# Patient Record
Sex: FEMALE | Race: WHITE | NOT HISPANIC OR LATINO | Employment: PART TIME | URBAN - METROPOLITAN AREA
[De-identification: names, ages, dates, MRNs, and addresses within clinical notes are randomized per-mention and may not be internally consistent; named-entity substitution may affect disease eponyms.]

---

## 2023-12-10 NOTE — PROGRESS NOTES
Assessment/Plan:  Pap every 3 years if normal, STI testing as indicated, exercise most days of week, obtain appropriate diet and hydration, Calcium 1000mg + 600 vit D daily,   Annual mammogram starting at age 34, monthly breast self exam.        Diagnoses and all orders for this visit:    Encounter for gynecological examination without abnormal finding  -     IGP, rfx Aptima HPV ASCU    Encounter for Papanicolaou smear for cervical cancer screening  -     IGP, rfx Aptima HPV ASCU    Screening for endocrine, metabolic and immunity disorder  -     CBC and Platelet; Future  -     Comprehensive metabolic panel; Future  -     TSH, 3rd generation with Free T4 reflex; Future  -     CBC and Platelet  -     Comprehensive metabolic panel  -     TSH, 3rd generation with Free T4 reflex    Encounter for vitamin deficiency screening  -     Vitamin D 25 hydroxy; Future  -     Vitamin D 25 hydroxy    Other orders  -     Prenatal Vit-Fe Fumarate-FA (Prenatal 19) 29-1 MG CHEW  -     valACYclovir (VALTREX) 1,000 mg tablet; 2 (two) times a day as needed  -     Liquid-based pap, screening          Subjective:      Patient ID: Alessio He is a 20 y.o. female. 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  LMP 12/9/2023  She has noticed change in cycle past 2-3 months Spots initially bleeds x 4 days then spots total time 10 days - 2 weeks FH hypothyroid Denies abd or pelvic pain Bowel and bladder are normal NO unusual discharge         The following portions of the patient's history were reviewed and updated as appropriate: allergies, current medications, past family history, past medical history, past social history, past surgical history, and problem list.    Review of Systems   Constitutional:  Negative for fatigue and unexpected weight change. Gastrointestinal:  Negative for abdominal distention, abdominal pain, constipation and diarrhea. Genitourinary:  Negative for difficulty urinating, dyspareunia, dysuria, frequency, genital sores, menstrual problem, pelvic pain, urgency, vaginal bleeding, vaginal discharge and vaginal pain. Neurological:  Negative for headaches. Psychiatric/Behavioral: Negative. Negative for dysphoric mood. The patient is not nervous/anxious. Objective:      /68   Ht 5' 3.75" (1.619 m)   Wt 62.2 kg (137 lb 3.2 oz)   LMP 12/09/2023 (Exact Date)   Breastfeeding No   BMI 23.74 kg/m²          Physical Exam  Vitals and nursing note reviewed. Constitutional:       General: She is not in acute distress. Appearance: Normal appearance. HENT:      Head: Normocephalic and atraumatic. Pulmonary:      Effort: Pulmonary effort is normal.   Chest:   Breasts:     Breasts are symmetrical.      Right: Normal. No mass, nipple discharge, skin change or tenderness. Left: Normal. No mass, nipple discharge, skin change or tenderness. Abdominal:      General: There is no distension. Palpations: Abdomen is soft. Tenderness: There is no abdominal tenderness. There is no guarding or rebound. Genitourinary:     General: Normal vulva. Exam position: Lithotomy position. Labia:         Right: No rash, tenderness, lesion or injury. Left: No rash, tenderness, lesion or injury. Urethra: No prolapse, urethral pain, urethral swelling or urethral lesion. Vagina: Normal. No erythema or lesions. Cervix: No cervical motion tenderness, discharge, lesion or cervical bleeding. Uterus: Normal.       Adnexa: Right adnexa normal and left adnexa normal.        Right: No mass or tenderness. Left: No mass or tenderness. Rectum: No mass or external hemorrhoid.       Comments: PAP from cervix  Musculoskeletal:         General: Normal range of motion. Lymphadenopathy:      Upper Body:      Right upper body: No axillary adenopathy. Left upper body: No axillary adenopathy. Lower Body: No right inguinal adenopathy. No left inguinal adenopathy. Skin:     General: Skin is warm and dry. Neurological:      Mental Status: She is alert and oriented to person, place, and time. Psychiatric:         Mood and Affect: Mood normal.         Behavior: Behavior normal.         Thought Content:  Thought content normal.         Judgment: Judgment normal.

## 2023-12-11 ENCOUNTER — OFFICE VISIT (OUTPATIENT)
Dept: OBGYN CLINIC | Facility: CLINIC | Age: 23
End: 2023-12-11
Payer: COMMERCIAL

## 2023-12-11 VITALS
SYSTOLIC BLOOD PRESSURE: 122 MMHG | BODY MASS INDEX: 23.42 KG/M2 | HEIGHT: 64 IN | DIASTOLIC BLOOD PRESSURE: 68 MMHG | WEIGHT: 137.2 LBS

## 2023-12-11 DIAGNOSIS — Z01.419 ENCOUNTER FOR GYNECOLOGICAL EXAMINATION WITHOUT ABNORMAL FINDING: Primary | ICD-10-CM

## 2023-12-11 DIAGNOSIS — Z13.29 SCREENING FOR ENDOCRINE, METABOLIC AND IMMUNITY DISORDER: ICD-10-CM

## 2023-12-11 DIAGNOSIS — Z13.228 SCREENING FOR ENDOCRINE, METABOLIC AND IMMUNITY DISORDER: ICD-10-CM

## 2023-12-11 DIAGNOSIS — Z13.21 ENCOUNTER FOR VITAMIN DEFICIENCY SCREENING: ICD-10-CM

## 2023-12-11 DIAGNOSIS — Z13.0 SCREENING FOR ENDOCRINE, METABOLIC AND IMMUNITY DISORDER: ICD-10-CM

## 2023-12-11 DIAGNOSIS — Z12.4 ENCOUNTER FOR PAPANICOLAOU SMEAR FOR CERVICAL CANCER SCREENING: ICD-10-CM

## 2023-12-11 PROCEDURE — S0610 ANNUAL GYNECOLOGICAL EXAMINA: HCPCS | Performed by: NURSE PRACTITIONER

## 2023-12-11 RX ORDER — FOLIC ACID, .BETA.-CAROTENE, ASCORBIC ACID, CHOLECALCIFEROL, .ALPHA.-TOCOPHEROL ACETATE, DL-, THIAMINE MONONITRATE, RIBOFLAVIN, NIACINAMIDE, PYRIDOXINE HYDROCHLORIDE, CYANOCOBALAMIN, CALCIUM PANTOTHENATE, CALCIUM CARBONATE, FERROUS FUMARATE, AND ZINC OXIDE 1; 1000; 100; 400; 30; 3; 3; 15; 20; 12; 7; 200; 29; 20 MG/1; [IU]/1; MG/1; [IU]/1; [IU]/1; MG/1; MG/1; MG/1; MG/1; UG/1; MG/1; MG/1; MG/1; MG/1
TABLET, CHEWABLE ORAL
COMMUNITY
Start: 2023-11-18

## 2023-12-11 RX ORDER — VALACYCLOVIR HYDROCHLORIDE 1 G/1
TABLET, FILM COATED ORAL 2 TIMES DAILY PRN
COMMUNITY
Start: 2023-09-29

## 2023-12-11 NOTE — PATIENT INSTRUCTIONS
Pap every 3 years if normal, STI testing as indicated, exercise most days of week, obtain appropriate diet and hydration, Calcium 1000mg + 600 vit D daily,   Annual mammogram starting at age 34, monthly breast self exam.

## 2023-12-12 LAB
25(OH)D3+25(OH)D2 SERPL-MCNC: 21.6 NG/ML (ref 30–100)
ALBUMIN SERPL-MCNC: 4.7 G/DL (ref 4–5)
ALBUMIN/GLOB SERPL: 1.6 {RATIO} (ref 1.2–2.2)
ALP SERPL-CCNC: 74 IU/L (ref 44–121)
ALT SERPL-CCNC: 28 IU/L (ref 0–32)
AST SERPL-CCNC: 26 IU/L (ref 0–40)
BILIRUB SERPL-MCNC: 0.6 MG/DL (ref 0–1.2)
BUN SERPL-MCNC: 9 MG/DL (ref 6–20)
BUN/CREAT SERPL: 12 (ref 9–23)
CALCIUM SERPL-MCNC: 9.6 MG/DL (ref 8.7–10.2)
CHLORIDE SERPL-SCNC: 101 MMOL/L (ref 96–106)
CO2 SERPL-SCNC: 21 MMOL/L (ref 20–29)
CREAT SERPL-MCNC: 0.75 MG/DL (ref 0.57–1)
EGFR: 113 ML/MIN/1.73
ERYTHROCYTE [DISTWIDTH] IN BLOOD BY AUTOMATED COUNT: 12.1 % (ref 11.7–15.4)
GLOBULIN SER-MCNC: 2.9 G/DL (ref 1.5–4.5)
GLUCOSE SERPL-MCNC: 104 MG/DL (ref 70–99)
HCT VFR BLD AUTO: 39.7 % (ref 34–46.6)
HGB BLD-MCNC: 13.5 G/DL (ref 11.1–15.9)
MCH RBC QN AUTO: 31 PG (ref 26.6–33)
MCHC RBC AUTO-ENTMCNC: 34 G/DL (ref 31.5–35.7)
MCV RBC AUTO: 91 FL (ref 79–97)
PLATELET # BLD AUTO: 187 X10E3/UL (ref 150–450)
POTASSIUM SERPL-SCNC: 4.4 MMOL/L (ref 3.5–5.2)
PROT SERPL-MCNC: 7.6 G/DL (ref 6–8.5)
RBC # BLD AUTO: 4.36 X10E6/UL (ref 3.77–5.28)
SODIUM SERPL-SCNC: 140 MMOL/L (ref 134–144)
TSH SERPL DL<=0.005 MIU/L-ACNC: 2.61 UIU/ML (ref 0.45–4.5)
WBC # BLD AUTO: 5.6 X10E3/UL (ref 3.4–10.8)

## 2023-12-13 LAB
CYTOLOGIST CVX/VAG CYTO: NORMAL
DX ICD CODE: NORMAL
OTHER STN SPEC: NORMAL
OTHER STN SPEC: NORMAL
PATH REPORT.FINAL DX SPEC: NORMAL
SL AMB NOTE:: NORMAL
SL AMB SPECIMEN ADEQUACY: NORMAL
SL AMB TEST METHODOLOGY: NORMAL

## 2024-01-15 ENCOUNTER — TELEPHONE (OUTPATIENT)
Dept: OBGYN CLINIC | Facility: CLINIC | Age: 24
End: 2024-01-15

## 2024-01-15 NOTE — TELEPHONE ENCOUNTER
Patient left v/m requesting the recommendations from vitamin D levels.  Reviewed vitamin D recommendations from result notes. She also reports having spotting before her periods, not enough to soak a pad, but minimal. The spotting last up until having full period and then has full period for at least 8 days, sometimes 9 days.  Pt reports she is trying to get pregnant and only has a minimal window when she's not spotting/having period. She is wondering if any of these things could be impacting her fertility or ovulation.  Pt advised to keep a menstrual calendar.  Cindi, please advise for any further recommendations.

## 2024-01-15 NOTE — TELEPHONE ENCOUNTER
Spoke with patient and recommendations given.  Advised to start using ovulation predictor kits.  Pt verbalized understanding and voiced appreciation.

## 2024-04-08 ENCOUNTER — OFFICE VISIT (OUTPATIENT)
Dept: OBGYN CLINIC | Facility: CLINIC | Age: 24
End: 2024-04-08
Payer: COMMERCIAL

## 2024-04-08 VITALS — SYSTOLIC BLOOD PRESSURE: 104 MMHG | BODY MASS INDEX: 24.05 KG/M2 | DIASTOLIC BLOOD PRESSURE: 60 MMHG | WEIGHT: 139 LBS

## 2024-04-08 DIAGNOSIS — Z11.3 SCREEN FOR STD (SEXUALLY TRANSMITTED DISEASE): ICD-10-CM

## 2024-04-08 DIAGNOSIS — Z31.69 INFERTILITY COUNSELING: Primary | ICD-10-CM

## 2024-04-08 PROCEDURE — 99214 OFFICE O/P EST MOD 30 MIN: CPT | Performed by: OBSTETRICS & GYNECOLOGY

## 2024-04-08 NOTE — PROGRESS NOTES
PROBLEM GYNECOLOGICAL VISIT    Beatriz Sánchez is a 23 y.o. female who presents today with complaint of irregular menses .  Trying to conceive since 2023. But of  ocp since  2023  Her general medical history has been reviewed and she reports it as follows:    Past Medical History:   Diagnosis Date   • Vitiligo      Past Surgical History:   Procedure Laterality Date   • WISDOM TOOTH EXTRACTION       OB History        0    Para   0    Term   0       0    AB   0    Living   0       SAB   0    IAB   0    Ectopic   0    Multiple   0    Live Births   0           Obstetric Comments   Menarche  at  10   ,  28-30    wearing tampons   chg  every   8 hours  ,  not up at night   to chg  cramps    not terrible  mild to mod   Started   birth control at  age    14 for contraception    hx of   HMB as a teen  and cramps             Social History     Tobacco Use   • Smoking status: Never   • Smokeless tobacco: Never   Vaping Use   • Vaping status: Never Used   Substance Use Topics   • Alcohol use: Not Currently   • Drug use: Never       Current Outpatient Medications   Medication Instructions   • Cholecalciferol (CHOLECALCIFEROL) 1,000 Units, Oral, Daily, Takes 2,000 IU daily   • Prenatal Vit-Fe Fumarate-FA (Prenatal 19) 29-1 MG CHEW    • valACYclovir (VALTREX) 1,000 mg tablet 2 times daily PRN       History of Present Illness:   NP, student .   Stopped pill  in  2023  pull out until  trying to conceive since 2023.    2023  normal  tsh,  CMB slight low  Vit  d  on supplementation    2000 iu a day.  Cycles  previously  in falll  25 d  for  10-12 d   Menarche at  10 years of age  28-30 .   Same partner    No hx of  std  .  No  hx of abn pap,  one  cycle of   ovulation predictors   + surge .  +  intercourse  every   other day or  every  third  day.       24 yo   no hx of a child .   No medical  hx,    wears  boxer  briefs.   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    .   + sister w  thryoid  disorders  .  Sister w  child w  cleft   pallet and   intra uterine  demise.  Runner!     Review of Systems:  Review of Systems   Constitutional: Negative.    Gastrointestinal: Negative.    Endocrine: Negative.    Genitourinary:  Positive for menstrual problem and vaginal discharge.        IRA and urgency of urination  has always  been    No  urology   work up .    Musculoskeletal: Negative.    Skin: Negative.    Allergic/Immunologic: Negative.    Neurological: Negative.    Hematological: Negative.    Psychiatric/Behavioral: Negative.         Physical Exam:  /60   Wt 63 kg (139 lb)   LMP 03/30/2024 (Exact Date)   BMI 24.05 kg/m²   Physical Exam  Constitutional:       Appearance: Normal appearance.   Genitourinary:      Bladder, rectum and urethral meatus normal.      Genitourinary Comments: Brown  dc  present        Right Labia: No rash, tenderness, lesions or skin changes.     Left Labia: No tenderness, lesions, skin changes or rash.     No inguinal adenopathy present in the right or left side.     No vaginal discharge, erythema, tenderness, ulceration or granulation tissue.      No vaginal prolapse present.     No vaginal atrophy present.       Right Adnexa: not tender, not full and no mass present.     Left Adnexa: not tender, not full and no mass present.     Cervix is not nulliparous or parous.      No cervical motion tenderness, discharge, friability, polyp or nabothian cyst.      Uterus is not enlarged, fixed, tender, irregular or prolapsed.      No uterine mass detected.     No urethral prolapse, tenderness or mass present.      Pelvic Floor: Levator muscle strength is 4/5.     Pelvic floor neuro is intact.     Pelvic exam was performed with patient in the lithotomy position.   Cardiovascular:      Rate and Rhythm: Normal rate and regular rhythm.   Abdominal:      Palpations: Abdomen is soft.      Hernia: There is no hernia in the left inguinal area or right inguinal area.   Musculoskeletal:       Cervical back: Normal range of motion.   Lymphadenopathy:      Lower Body: No right inguinal adenopathy. No left inguinal adenopathy.   Neurological:      Mental Status: She is alert.         Assessment:   1.  Trying to conceive for   6-12 mo.   Screen for std   , prolonged cycle      Plan:   Decrease running go to [employer] or yoga.  Switch  to   boxers.   Day 3  fsh and  lh w estradiol.  Day 5-10  tv us  day  21 fasting   prolactin and  progesterone.  TC  semen analysis  order given and   HSG. I have spent a total time of 30 minutes on 04/08/24 in caring for this patient including Risks and benefits of tx options, Instructions for management, Patient and family education, Risk factor reductions, Counseling / Coordination of care, Documenting in the medical record, Reviewing / ordering tests, medicine, procedures  , and Obtaining or reviewing history  . Continue  pnv  no drinking      Reviewed with patient that [facility], but that they will not necessarily be reviewed by me immediately.  Explained that I will review results at my earliest opportunity and contact patient appropriately.

## 2024-04-11 LAB
C TRACH RRNA SPEC QL NAA+PROBE: NEGATIVE
N GONORRHOEA RRNA SPEC QL NAA+PROBE: NEGATIVE

## 2024-04-30 ENCOUNTER — NURSE TRIAGE (OUTPATIENT)
Age: 24
End: 2024-04-30

## 2024-04-30 DIAGNOSIS — Z31.69 INFERTILITY COUNSELING: Primary | ICD-10-CM

## 2024-04-30 NOTE — TELEPHONE ENCOUNTER
Reason for Disposition  • Information only question and nurse able to answer    Answer Assessment - Initial Assessment Questions  1. REASON FOR CALL or QUESTION:     Beatriz went to lab today for Day 3 labs.  Technician would not separate Day 3 from Day 21.  Recommended she obtain new scripts and return for blood draw.  Beatriz is currently on Day 3 and was not able to return to lab. She allowed them to draw blood for all the orders and is asking for new orders to be re-done on day 21    Needs Day 21 labs Prolactin and Progesterone    Protocols used: Information Only Call - No Triage-ADULT-[place]

## 2024-05-01 LAB
ESTRADIOL SERPL-MCNC: 86 PG/ML
FSH SERPL-ACNC: 4.8 MIU/ML
LH SERPL-ACNC: 6.1 MIU/ML
PROGEST SERPL-MCNC: 1 NG/ML
PROLACTIN SERPL-MCNC: 53.2 NG/ML (ref 4.8–33.4)
TESTOST FREE SERPL-MCNC: 4.6 PG/ML (ref 0–4.2)
TESTOST SERPL-MCNC: 39 NG/DL (ref 13–71)
TSH SERPL DL<=0.005 MIU/L-ACNC: 2.7 UIU/ML (ref 0.45–4.5)

## 2024-05-04 ENCOUNTER — HOSPITAL ENCOUNTER (OUTPATIENT)
Dept: ULTRASOUND IMAGING | Facility: HOSPITAL | Age: 24
Discharge: HOME | End: 2024-05-04
Payer: COMMERCIAL

## 2024-05-04 DIAGNOSIS — Z31.69 INFERTILITY COUNSELING: ICD-10-CM

## 2024-05-04 PROCEDURE — 76830 TRANSVAGINAL US NON-OB: CPT

## 2024-05-04 PROCEDURE — 76856 US EXAM PELVIC COMPLETE: CPT

## 2024-05-08 PROBLEM — Z11.3 SCREEN FOR STD (SEXUALLY TRANSMITTED DISEASE): Status: RESOLVED | Noted: 2024-04-08 | Resolved: 2024-05-08

## 2024-05-18 LAB
PROGEST SERPL-MCNC: 4.2 NG/ML
PROLACTIN SERPL-MCNC: 13.6 NG/ML (ref 4.8–33.4)

## 2024-06-11 ENCOUNTER — TELEPHONE (OUTPATIENT)
Age: 24
End: 2024-06-11

## 2024-06-11 NOTE — TELEPHONE ENCOUNTER
Pt called in to review results of the semen analysis test. Patient stated that her  went to main line fertility in [facility], and patient was notified that results aren't uploaded to her chart. Patient stated that her  tried calling the lab he went to, to request results but the lab was closed for the day, pt stated that her  will try to call tomorrow.

## 2024-06-12 ENCOUNTER — TELEPHONE (OUTPATIENT)
Dept: OBGYN CLINIC | Facility: CLINIC | Age: 24
End: 2024-06-12

## 2024-06-12 NOTE — TELEPHONE ENCOUNTER
Beatriz states she and spouse Dakota have been trying to obtain the outcome of the Semen Analysis for days. Wishes to speak to provider. Assured Beatriz result is in her chart for review.

## 2024-06-13 NOTE — PROGRESS NOTES
Tc to pt   morphology   off.  Pt  aware.  And   is going to make some life style changes.  Would like to repeat in a few months. Pt doing a menstrual  calendar.  OV predictors are stressful.  Fu in 9/2024  has been actively trying since 11/2023.  Not ready for  [place].

## 2024-08-13 ENCOUNTER — TELEPHONE (OUTPATIENT)
Age: 24
End: 2024-08-13

## 2024-08-13 NOTE — TELEPHONE ENCOUNTER
Patient newly pregnant. By lmp is 4w 4d today.  Leaving for [facility] 9/1. Reviewed flight instructins  Patient will send do arcos a myc message with any further concerns

## 2024-08-13 NOTE — TELEPHONE ENCOUNTER
Pt is pregnant and plans to take a trip to [place] in early September. She wants to know if this is safe to do this early in her pregnancy and precautions she should take. Please advise.

## 2024-08-26 ENCOUNTER — NURSE TRIAGE (OUTPATIENT)
Age: 24
End: 2024-08-26

## 2024-08-26 ENCOUNTER — HOSPITAL ENCOUNTER (EMERGENCY)
Facility: HOSPITAL | Age: 24
Discharge: HOME | End: 2024-08-26
Attending: EMERGENCY MEDICINE
Payer: COMMERCIAL

## 2024-08-26 ENCOUNTER — APPOINTMENT (OUTPATIENT)
Dept: ULTRASOUND IMAGING | Facility: HOSPITAL | Age: 24
End: 2024-08-26
Payer: COMMERCIAL

## 2024-08-26 VITALS
BODY MASS INDEX: 24.05 KG/M2 | WEIGHT: 139 LBS | HEART RATE: 80 BPM | TEMPERATURE: 98.3 F | OXYGEN SATURATION: 99 % | SYSTOLIC BLOOD PRESSURE: 122 MMHG | RESPIRATION RATE: 18 BRPM | DIASTOLIC BLOOD PRESSURE: 75 MMHG

## 2024-08-26 DIAGNOSIS — O26.891 ABDOMINAL PAIN DURING PREGNANCY IN FIRST TRIMESTER: Primary | ICD-10-CM

## 2024-08-26 DIAGNOSIS — R10.9 ABDOMINAL PAIN DURING PREGNANCY IN FIRST TRIMESTER: Primary | ICD-10-CM

## 2024-08-26 LAB
ABO GROUP BLD: NORMAL
ANION GAP SERPL CALCULATED.3IONS-SCNC: 8 MMOL/L (ref 4–13)
B-HCG SERPL-ACNC: ABNORMAL MIU/ML (ref 0–5)
BASOPHILS # BLD AUTO: 0.04 THOUSANDS/ÂΜL (ref 0–0.1)
BASOPHILS NFR BLD AUTO: 0 % (ref 0–1)
BILIRUB UR QL STRIP: NEGATIVE
BLD GP AB SCN SERPL QL: NEGATIVE
BUN SERPL-MCNC: 12 MG/DL (ref 5–25)
CALCIUM SERPL-MCNC: 9.3 MG/DL (ref 8.4–10.2)
CHLORIDE SERPL-SCNC: 104 MMOL/L (ref 96–108)
CLARITY UR: CLEAR
CO2 SERPL-SCNC: 25 MMOL/L (ref 21–32)
COLOR UR: YELLOW
CREAT SERPL-MCNC: 0.66 MG/DL (ref 0.6–1.3)
EOSINOPHIL # BLD AUTO: 0.1 THOUSAND/ÂΜL (ref 0–0.61)
EOSINOPHIL NFR BLD AUTO: 1 % (ref 0–6)
ERYTHROCYTE [DISTWIDTH] IN BLOOD BY AUTOMATED COUNT: 11.7 % (ref 11.6–15.1)
EXT PREGNANCY TEST URINE: POSITIVE
EXT. CONTROL: ABNORMAL
GFR SERPL CREATININE-BSD FRML MDRD: 122 ML/MIN/1.73SQ M
GLUCOSE SERPL-MCNC: 93 MG/DL (ref 65–140)
GLUCOSE UR STRIP-MCNC: NEGATIVE MG/DL
HCT VFR BLD AUTO: 35.5 % (ref 34.8–46.1)
HGB BLD-MCNC: 12.4 G/DL (ref 11.5–15.4)
HGB UR QL STRIP.AUTO: NEGATIVE
IMM GRANULOCYTES # BLD AUTO: 0.05 THOUSAND/UL (ref 0–0.2)
IMM GRANULOCYTES NFR BLD AUTO: 1 % (ref 0–2)
KETONES UR STRIP-MCNC: NEGATIVE MG/DL
LEUKOCYTE ESTERASE UR QL STRIP: NEGATIVE
LYMPHOCYTES # BLD AUTO: 1.82 THOUSANDS/ÂΜL (ref 0.6–4.47)
LYMPHOCYTES NFR BLD AUTO: 18 % (ref 14–44)
MCH RBC QN AUTO: 32.3 PG (ref 26.8–34.3)
MCHC RBC AUTO-ENTMCNC: 34.9 G/DL (ref 31.4–37.4)
MCV RBC AUTO: 92 FL (ref 82–98)
MONOCYTES # BLD AUTO: 0.92 THOUSAND/ÂΜL (ref 0.17–1.22)
MONOCYTES NFR BLD AUTO: 9 % (ref 4–12)
NEUTROPHILS # BLD AUTO: 7.07 THOUSANDS/ÂΜL (ref 1.85–7.62)
NEUTS SEG NFR BLD AUTO: 71 % (ref 43–75)
NITRITE UR QL STRIP: NEGATIVE
NRBC BLD AUTO-RTO: 0 /100 WBCS
PH UR STRIP.AUTO: 6.5 [PH]
PLATELET # BLD AUTO: 192 THOUSANDS/UL (ref 149–390)
PMV BLD AUTO: 10 FL (ref 8.9–12.7)
POTASSIUM SERPL-SCNC: 3.4 MMOL/L (ref 3.5–5.3)
PROT UR STRIP-MCNC: NEGATIVE MG/DL
RBC # BLD AUTO: 3.84 MILLION/UL (ref 3.81–5.12)
RH BLD: POSITIVE
SODIUM SERPL-SCNC: 137 MMOL/L (ref 135–147)
SP GR UR STRIP.AUTO: 1.01 (ref 1–1.03)
SPECIMEN EXPIRATION DATE: NORMAL
UROBILINOGEN UR STRIP-ACNC: <2 MG/DL
WBC # BLD AUTO: 10 THOUSAND/UL (ref 4.31–10.16)

## 2024-08-26 PROCEDURE — 76801 OB US < 14 WKS SINGLE FETUS: CPT

## 2024-08-26 PROCEDURE — 99284 EMERGENCY DEPT VISIT MOD MDM: CPT | Performed by: EMERGENCY MEDICINE

## 2024-08-26 PROCEDURE — 85025 COMPLETE CBC W/AUTO DIFF WBC: CPT | Performed by: EMERGENCY MEDICINE

## 2024-08-26 PROCEDURE — 81003 URINALYSIS AUTO W/O SCOPE: CPT | Performed by: EMERGENCY MEDICINE

## 2024-08-26 PROCEDURE — 36415 COLL VENOUS BLD VENIPUNCTURE: CPT | Performed by: EMERGENCY MEDICINE

## 2024-08-26 PROCEDURE — 99284 EMERGENCY DEPT VISIT MOD MDM: CPT

## 2024-08-26 PROCEDURE — 86850 RBC ANTIBODY SCREEN: CPT | Performed by: EMERGENCY MEDICINE

## 2024-08-26 PROCEDURE — 86901 BLOOD TYPING SEROLOGIC RH(D): CPT | Performed by: EMERGENCY MEDICINE

## 2024-08-26 PROCEDURE — 81025 URINE PREGNANCY TEST: CPT | Performed by: EMERGENCY MEDICINE

## 2024-08-26 PROCEDURE — 80048 BASIC METABOLIC PNL TOTAL CA: CPT | Performed by: EMERGENCY MEDICINE

## 2024-08-26 PROCEDURE — 84702 CHORIONIC GONADOTROPIN TEST: CPT | Performed by: EMERGENCY MEDICINE

## 2024-08-26 PROCEDURE — 86900 BLOOD TYPING SEROLOGIC ABO: CPT | Performed by: EMERGENCY MEDICINE

## 2024-08-26 NOTE — TELEPHONE ENCOUNTER
"Patient calling in stating that she's having right sided pelvic pain and patient is reporting she is currently pregnant. Pt LMP 7/12/24. Pt reporting that the pain radiates to her lower back at times and can sharp at  times. Pt reporting this started a week to a week and a half ago. Pt reporting gradual onset and sharp at times that comes and goes. Pt reporting 3-4/10. Pt reporting sitting makes it worse. And standing relieves it slightly     Patient is advised to go to the nearest emergency room for further evaluation now. Patient stated that she's unsure if she can go tonight, but pt reporting that she can go tomorrow. Pt reporting that she will be going to [facility] tomorrow.     Epic Secure Chat sent to Dr Cornell Madrigal- on call provider     Reason for Disposition   Intermittent lower abdominal pain lasting > 24 hours    Answer Assessment - Initial Assessment Questions  1. LOCATION: \"Where does it hurt?\"       Lower right abdominal pain   2. RADIATION: \"Does the pain shoot anywhere else?\" (e.g., chest, back, shoulder)      Back on the right sick   3. ONSET: \"When did the pain begin?\" (e.g., minutes, hours or days ago)       A week- a week and a half ago.   4. ONSET: \"Gradual or sudden onset?\"      Pt reporting gradual onset and sharp at times.   5. PATTERN \"Does the pain come and go, or has it been constant since it started?\"       Pt reporting it comes and goes   6. SEVERITY: \"How bad is the pain?\" \"What does it keep you from doing?\"  (e.g., Scale 1-10; mild, moderate, or severe)    - MILD (1-3): doesn't interfere with normal activities, abdomen soft and not tender to touch     - MODERATE (4-7): interferes with normal activities or awakens from sleep, tender to touch     - SEVERE (8-10): excruciating pain, doubled over, unable to do any normal activities      Pt reporting 3-4/10   7. RECURRENT SYMPTOM: \"Have you ever had this type of stomach pain before?\" If Yes, ask: \"When was the last time?\" and " "\"What happened that time?\"       Pt denies   8. CAUSE: \"What do you think is causing the stomach pain?\"      Pt unsure   9. RELIEVING/AGGRAVATING FACTORS: \"What makes it better or worse?\" (e.g., antacids, bowel movement, movement)      Pt reporting sitting makes it worse. And standing relieves it slightly   10. OTHER SYMPTOMS: \"Has there been any vaginal bleeding, fever, vomiting, diarrhea, or urine problems?\"        Pt denies all other symptoms   11. ALIDA: \"What date are you expecting to deliver?\"        N/A    Protocols used: Pregnancy - Abdominal Pain Less Than 20 Weeks EGA-ADULT-[place]    "

## 2024-08-26 NOTE — ED NOTES
Unable to obtain BW in triage. 1 attempt made by tech and pt requesting to wait until in room for second attempt     Lisa Arias RN  08/26/24 [id number]

## 2024-08-27 NOTE — ED PROVIDER NOTES
History  Chief Complaint   Patient presents with    Abdominal Pain     Pt to ED c/o abd pain. 7 weeks pregnant. Over past week been having intermittent R lower abd pain. Sometimes sharp. Today also have R lower back pain.     Beatriz Sánchez is a 24 y.o. year old  female presenting to the [facility] ED for abdominal pain. Patient has had one week of intermittent right lower abdominal pain. Pain is reported to be mild in intensity. She is also reporting occasional right sided low back pain.  No associated fevers or chills.  No nausea, vomiting or diarrhea.  No dysuria or hematuria.  No vaginal bleeding or discharge.  Patient contacted her OB/GYN regarding symptoms and was referred to the emergency department for evaluation. Patient has not taken/received any medications at home for relief of symptoms. Patient denies history of prior abdominal surgeries.      History provided by:  Medical records and patient   used: No    Abdominal Pain  Associated symptoms: no chest pain, no diarrhea, no dysuria, no fever, no hematuria, no nausea, no shortness of breath, no vaginal bleeding, no vaginal discharge and no vomiting        Prior to Admission Medications   Prescriptions Last Dose Informant Patient Reported? Taking?   Prenatal Vit-Fe Fumarate-FA (Prenatal 19) 29-1 MG CHEW   Yes No   cholecalciferol 1,000 units tablet   Yes No   Sig: Take 1,000 Units by mouth daily Takes 2,000 IU daily   valACYclovir (VALTREX) 1,000 mg tablet   Yes No   Si (two) times a day as needed      Facility-Administered Medications: None       Past Medical History:   Diagnosis Date    Vitiligo        Past Surgical History:   Procedure Laterality Date    WISDOM TOOTH EXTRACTION         Family History   Problem Relation Age of Onset    Thyroid disease Mother     Thyroid disease Sister     Heart attack Paternal Grandfather     Heart disease Paternal Grandfather     Breast cancer Neg Hx     Uterine cancer Neg Hx     Ovarian  cancer Neg Hx     Colon cancer Neg Hx      I have reviewed and agree with the history as documented.    E-Cigarette/Vaping    E-Cigarette Use Never User      E-Cigarette/Vaping Substances    Nicotine No     THC No     CBD No     Flavoring No     Other No     Unknown No      Social History     Tobacco Use    Smoking status: Never    Smokeless tobacco: Never   Vaping Use    Vaping status: Never Used   Substance Use Topics    Alcohol use: Not Currently    Drug use: Never       Review of Systems   Constitutional:  Negative for fever.   Respiratory:  Negative for shortness of breath.    Cardiovascular:  Negative for chest pain.   Gastrointestinal:  Positive for abdominal pain. Negative for diarrhea, nausea and vomiting.   Genitourinary:  Negative for dysuria, flank pain, hematuria, vaginal bleeding and vaginal discharge.   Musculoskeletal:  Positive for back pain.   All other systems reviewed and are negative.      Physical Exam  Physical Exam  Vitals and nursing note reviewed.   Constitutional:       General: She is not in acute distress.     Appearance: Normal appearance. She is well-developed. She is not ill-appearing, toxic-appearing or diaphoretic.   HENT:      Head: Normocephalic and atraumatic.      Nose: No congestion or rhinorrhea.   Eyes:      General:         Right eye: No discharge.         Left eye: No discharge.   Cardiovascular:      Rate and Rhythm: Normal rate and regular rhythm.   Pulmonary:      Effort: Pulmonary effort is normal. No accessory muscle usage or respiratory distress.      Breath sounds: Normal breath sounds. No stridor. No decreased breath sounds, wheezing, rhonchi or rales.   Abdominal:      General: Bowel sounds are normal. There is no distension.      Palpations: Abdomen is soft.      Tenderness: There is no abdominal tenderness. There is no right CVA tenderness, left CVA tenderness, guarding or rebound.   Musculoskeletal:      Cervical back: Normal range of motion and neck supple.       Right lower leg: No tenderness. No edema.      Left lower leg: No tenderness. No edema.   Skin:     Capillary Refill: Capillary refill takes less than 2 seconds.      Findings: No rash.   Neurological:      Mental Status: She is alert and oriented to person, place, and time.   Psychiatric:         Mood and Affect: Mood normal.         Behavior: Behavior normal.         Vital Signs  ED Triage Vitals [08/26/24 1837]   Temperature Pulse Respirations Blood Pressure SpO2   98.3 °F (36.8 °C) 97 18 (!) 167/101 100 %      Temp Source Heart Rate Source Patient Position - Orthostatic VS BP Location FiO2 (%)   Temporal Monitor Sitting Left arm --      Pain Score       3           Vitals:    08/26/24 1837 08/26/24 2124   BP: (!) 167/101 122/75   Pulse: 97 80   Patient Position - Orthostatic VS: Sitting          Visual Acuity      ED Medications  Medications - No data to display    Diagnostic Studies  Results Reviewed       Procedure Component Value Units Date/Time    Quantitative hCG [[id number]]  (Abnormal) Collected: 08/26/24 2158    Lab Status: Final result Specimen: Blood from Arm, Right Updated: 08/26/24 2327     HCG, Quant 23,714.6 mIU/mL     Narrative:       Expected Ranges:    HCG results between 5.0 and 25.0 mIU/mL may be indicative of early pregnancy but should be interpreted in light of the total clinical presentation.    HCG can rise to detectable levels in amrky and post menopausal women (0-11.6 mIU/mL).     Approximate               Approximate HCG  Gestation age          Concentration ( mIU/mL)  _____________          ______________________   Weeks                      HCG values  0.2-1                       5-50  1-2                           2-3                         100-5000  3-4                         500-33466  4-5                         1000-17934  5-6                         53720-090857  6-8                         [id number]  8-12                        30410-608540      Basic metabolic panel  [[id number]]  (Abnormal) Collected: 08/26/24 2158    Lab Status: Final result Specimen: Blood from Arm, Right Updated: 08/26/24 2253     Sodium 137 mmol/L      Potassium 3.4 mmol/L      Chloride 104 mmol/L      CO2 25 mmol/L      ANION GAP 8 mmol/L      BUN 12 mg/dL      Creatinine 0.66 mg/dL      Glucose 93 mg/dL      Calcium 9.3 mg/dL      eGFR 122 ml/min/1.73sq m     Narrative:      National Kidney Disease Foundation guidelines for Chronic Kidney Disease (CKD):     Stage 1 with normal or high GFR (GFR > 90 mL/min/1.73 square meters)    Stage 2 Mild CKD (GFR = 60-89 mL/min/1.73 square meters)    Stage 3A Moderate CKD (GFR = 45-59 mL/min/1.73 square meters)    Stage 3B Moderate CKD (GFR = 30-44 mL/min/1.73 square meters)    Stage 4 Severe CKD (GFR = 15-29 mL/min/1.73 square meters)    Stage 5 End Stage CKD (GFR <15 mL/min/1.73 square meters)  Note: GFR calculation is accurate only with a steady state creatinine    CBC and differential [[id number]] Collected: 08/26/24 2158    Lab Status: Final result Specimen: Blood from Arm, Right Updated: 08/26/24 2212     WBC 10.00 Thousand/uL      RBC 3.84 Million/uL      Hemoglobin 12.4 g/dL      Hematocrit 35.5 %      MCV 92 fL      MCH 32.3 pg      MCHC 34.9 g/dL      RDW 11.7 %      MPV 10.0 fL      Platelets 192 Thousands/uL      nRBC 0 /100 WBCs      Segmented % 71 %      Immature Grans % 1 %      Lymphocytes % 18 %      Monocytes % 9 %      Eosinophils Relative 1 %      Basophils Relative 0 %      Absolute Neutrophils 7.07 Thousands/µL      Absolute Immature Grans 0.05 Thousand/uL      Absolute Lymphocytes 1.82 Thousands/µL      Absolute Monocytes 0.92 Thousand/µL      Eosinophils Absolute 0.10 Thousand/µL      Basophils Absolute 0.04 Thousands/µL     UA w Reflex to Microscopic w Reflex to Culture [[id number]] Collected: 08/26/24 2118    Lab Status: Final result Specimen: Urine, Clean Catch Updated: 08/26/24 2141     Color, UA Yellow     Clarity, UA Clear     Specific  Gravity, UA 1.010     pH, UA 6.5     Leukocytes, UA Negative     Nitrite, UA Negative     Protein, UA Negative mg/dl      Glucose, UA Negative mg/dl      Ketones, UA Negative mg/dl      Urobilinogen, UA <2.0 mg/dl      Bilirubin, UA Negative     Occult Blood, UA Negative    POCT pregnancy, urine [[id number]]  (Abnormal) Resulted: 08/26/24 2121    Lab Status: Final result Updated: 08/26/24 2121     EXT Preg Test, Ur Positive     Control Valid                   US OB < 14 weeks with transvaginal   Final Result by Obie Deutsch DO (08/26 2108)   Single live intrauterine gestation at 5 weeks 6 days.   ALIDA of 4/22/2025. Embryonic heart activity visible although reliable tracing could not be obtained.      Normal ovaries. No sonographic evidence of torsion at time of imaging. No adnexal mass.      Workstation performed: [id number]                    Procedures  Procedures         ED Course                                 SBIRT 20yo+      Flowsheet Row Most Recent Value   Initial Alcohol Screen: US AUDIT-C     1. How often do you have a drink containing alcohol? 0 Filed at: 08/26/2024 1838   2. How many drinks containing alcohol do you have on a typical day you are drinking?  0 Filed at: 08/26/2024 1838   3a. Male UNDER 65: How often do you have five or more drinks on one occasion? 0 Filed at: 08/26/2024 1838   3b. FEMALE Any Age, or MALE 65+: How often do you have 4 or more drinks on one occassion? 0 Filed at: 08/26/2024 1838   Audit-C Score 0 Filed at: 08/26/2024 1838   LILY: How many times in the past year have you...    Used an illegal drug or used a prescription medication for non-medical reasons? Never Filed at: 08/26/2024 1838                      Medical Decision Making    24 y.o. female presenting for intermittent lower abdominal and low back pain.  Vitals are stable, nontoxic-appearing.  Will order labs to screen for leukocytosis, anemia, joint abnormality.  Will check UA to screen for UTI.  Will order type  and screen, quantitative hCG and pelvic ultrasound to exclude ovarian torsion or ectopic pregnancy.  There are no fevers or vomiting and patient has no objective lower abdominal tenderness on exam.  I do not suspect kidney stone or appendicitis.  Symptoms more likely attributable to first trimester pregnancy.    Reassessment: Vital stable during ED course.  No vomiting and resting comfortably.  I reviewed the lab, UA and ultrasound results with the patient and her significant other at bedside.  Continue to suspect pains related to early pregnancy.    Disposition: I have discussed with the patient our plan to discharge them from the ED and the patient is in agreement with this plan.     Discharge Plan: Encouraged Tylenol as needed for discomfort and outpatient OB f/u. RTED precautions emphasized. The patient was provided a written after visit summary with strict RTED precautions.     Followup: I have discussed with the patient plan to follow up with OB/GYN. Contact information provided in AVS.    Amount and/or Complexity of Data Reviewed  Labs: ordered.                 Disposition  Final diagnoses:   Abdominal pain during pregnancy in first trimester     Time reflects when diagnosis was documented in both MDM as applicable and the Disposition within this note       Time User Action Codes Description Comment    8/26/2024 10:28 PM Papa Aguayo Add [O26.891,  R10.9] Abdominal pain during pregnancy in first trimester           ED Disposition       ED Disposition   Discharge    Condition   Stable    Date/Time   Mon Aug 26, 2024 2227    Comment   Beatriz Sánchez discharge to home/self care.                   Follow-up Information       Follow up With Specialties Details Why Contact Info Additional Information    [facility] [place] [facility] Schedule an appointment as soon as possible for a visit  To make appointment for reevaluation in 3-5 days. [address]  [place]  [place] [address]  [phone] [facility] [place], [address], [place], [address], [phone]            Discharge Medication List as of 8/26/2024 10:32 PM        CONTINUE these medications which have NOT CHANGED    Details   cholecalciferol 1,000 units tablet Take 1,000 Units by mouth daily Takes 2,000 IU daily, Historical Med      Prenatal Vit-Fe Fumarate-FA (Prenatal 19) 29-1 MG CHEW Historical Med      valACYclovir (VALTREX) 1,000 mg tablet 2 (two) times a day as needed, Starting Fri 9/29/2023, Historical Med             No discharge procedures on file.    PDMP Review       None            ED Provider  Electronically Signed by             Papa Aguayo DO  08/27/24 0242

## 2024-08-27 NOTE — DISCHARGE INSTRUCTIONS
You have been seen for abdominal pain. Please take tylenol as needed for pain. Return to the emergency department if you develop worsening pain, vomiting, fevers, vaginal bleeding or any other symptoms of concern. Please follow up with your OB/GYN by calling the number provided.

## 2024-09-13 ENCOUNTER — ULTRASOUND (OUTPATIENT)
Dept: OBGYN CLINIC | Facility: CLINIC | Age: 24
End: 2024-09-13
Payer: COMMERCIAL

## 2024-09-13 VITALS
BODY MASS INDEX: 24.24 KG/M2 | HEIGHT: 64 IN | SYSTOLIC BLOOD PRESSURE: 122 MMHG | DIASTOLIC BLOOD PRESSURE: 66 MMHG | WEIGHT: 142 LBS

## 2024-09-13 DIAGNOSIS — O26.891 VAGINAL DISCHARGE DURING PREGNANCY IN FIRST TRIMESTER: Primary | ICD-10-CM

## 2024-09-13 DIAGNOSIS — Z3A.08 8 WEEKS GESTATION OF PREGNANCY: ICD-10-CM

## 2024-09-13 DIAGNOSIS — N89.8 VAGINAL DISCHARGE DURING PREGNANCY IN FIRST TRIMESTER: Primary | ICD-10-CM

## 2024-09-13 DIAGNOSIS — O20.9 BLEEDING IN EARLY PREGNANCY: ICD-10-CM

## 2024-09-13 LAB
CLUE CELLS SPEC QL WET PREP: ABNORMAL
PH SMN: 4.5 [PH]
SL AMB POCT WET MOUNT: ABNORMAL
T VAGINALIS VAG QL WET PREP: ABNORMAL
YEAST VAG QL WET PREP: ABNORMAL

## 2024-09-13 PROCEDURE — 76817 TRANSVAGINAL US OBSTETRIC: CPT | Performed by: PHYSICIAN ASSISTANT

## 2024-09-13 PROCEDURE — 99213 OFFICE O/P EST LOW 20 MIN: CPT | Performed by: PHYSICIAN ASSISTANT

## 2024-09-13 PROCEDURE — 87210 SMEAR WET MOUNT SALINE/INK: CPT | Performed by: PHYSICIAN ASSISTANT

## 2024-09-13 NOTE — PROGRESS NOTES
Pregnancy Confirmation Visit  [facility] - [address], [place] [address]    Assessment/Plan:  24 y.o.  presenting with missed menses.  Viable pregnancy 8w3d by first ultrasound done in ER on 2024.   - Continue/start prenatal vitamin  - We reviewed her current medications and discussed which are safe to continue in pregnancy  - We briefly discussed options for aneuploidy screening, to be discussed further at the prenatal intake, MFM referral given.   - Schedule prenatal intake with RN and initial prenatal visit; prenatal labs will be ordered during the prenatal intake    Additional Pregnancy Problems Addressed today:   1. Vaginal discharge during pregnancy in first trimester  Assessment & Plan:  Reviewed yeast on wet mount. Recommend treating with 3 or 7 day monistat.   Orders:  -     POCT wet mount  2. 8 weeks gestation of pregnancy  -     Ambulatory Referral to Maternal Fetal Medicine; Future; Expected date: 2024  -     AMB  OB < 14 weeks single or first gestation level 1  3. Bleeding in early pregnancy  -     [facility] OB < 14 weeks single or first gestation level 1        Subjective:    CC: Missed period    Beatriz Sánchez is a 24 y.o.  who presents with missed menses.  Patient's last menstrual period was 2024 (exact date)..    Patient notes that this pregnancy was planned and desired.  She was not using contraception at the time of conception. She reports she is uncertain of her exact LMP as she as multiple days of spottign leading up to menses making difficult to determine first day. Her last menses had full bleeding on 2024 but then was light for a few days after before bleeding picked back up again. She has irregular menses, approximately q25-28 days.  She has had vaginal bleeding since her LMP, light spotting 2 weeks ago and again tinge last week day after intercourse. Nothing since.     Reports noticed vaginal discharge and as well  "irritation. Unsure if infection. No STD concerns.     Objective:  /66 (BP Location: Left arm, Patient Position: Sitting, Cuff Size: Standard)   Ht 5' 4\" (1.626 m)   Wt 64.4 kg (142 lb)   LMP 07/12/2024 (Exact Date)   Breastfeeding No   BMI 24.37 kg/m²     Physical Exam:  General: Well appearing, no distress  CV: Regular rate  Respiratory: Unlabored breathing  Abdomen: Soft, nontender  Extremities: Without edema  Mood and Affect: Appropriate    FIRST TRIMESTER OBSTETRIC ULTRASOUND  Date of study: 9/13/2024  Performed by: Jennifer Horn PA-C     INDICATION: Amenorrhea, viability    COMPARISON: None.     TECHNIQUE:   Transvaginal imaging was performed to assess the gestation, myometrial/endometrial architecture and ovarian parenchymal detail.    The study includes volumetric sweeps and traditional still imaging technique.      FINDINGS:     A single intrauterine gestation is identified.  Cardiac activity is detected at 177.      YOLK SAC:  Present and normal in size and appearance.  MEAN CROWN RUMP LENGTH:  19.0 mm = 8 weeks 3 days   AMNIOTIC FLUID/SAC SHAPE:  Within expected normal range.     UTERUS/ADNEXA:   No adnexal mass or pathologic cyst.  No free fluid identified.     IMPRESSION:    Will assign dating based on ultrasound done at ER on 8/26/2024, consistent with scan today.   Final ALIDA: 4/22/2024  Gestational age: 8w3d  Fetal cardiac activity detected.  No adnexal masses seen.    Jennifer Horn PA-C  9/13/2024 4:54 PM       Additional Findings: none     FHR: 177    Assigning a Final ALIDA  Will assign dating based on ultrasound done at ER on 8/26/2024, consistent with scan today.   Final ALIDA: 4/22/2024        Jennifer Horn PA-C  [facility] [address] [address]  Dept: [phone]  Loc Appt: [phone]  Loc: [phone]       Wet mount: budding yeast throughout. No trichomonads or clue cells. Ph: " 4.5

## 2024-09-30 ENCOUNTER — PATIENT MESSAGE (OUTPATIENT)
Dept: OBGYN CLINIC | Facility: CLINIC | Age: 24
End: 2024-09-30

## 2024-09-30 ENCOUNTER — NURSE TRIAGE (OUTPATIENT)
Age: 24
End: 2024-09-30

## 2024-09-30 NOTE — TELEPHONE ENCOUNTER
"Beatriz reports completed monistat 7 as directed for yeast infection- at time had been having pink/brown discharge and irritation. 9/13-9/20      Mild itching/irritation continues-randomly with minimal spotting pink/brown discharge x 2.  Once this morning small amount on underwear. This afternoon minimal amount pink/brown with wiping.  + intercourse on Friday.     Encouraged wash with mild soap.  Pat dry, can apply aquaphor to external vaginal tissue, Residual intermittent irritation/itch may be from dryness post monistat.  If no improvement or worsening c/b for additional recommendation.    Advised not uncommon to have light spotting up to 3 days post intercourse.  Continue to monitor, pelvic rest x one week. Call back if spotting increases or persists.    Aware will forward to on call OB and c/b if  additional recommendations are provided.     ESC to Dr. Gagnon: for additional recommendation  ESC reply: Thumbs up, no additional recommendations.     Reason for Disposition  • SPOTTING (single or brief episode)    Answer Assessment - Initial Assessment Questions  1. ONSET: \"When did this bleeding start?\"        10am  2. DESCRIPTION: \"Describe the bleeding that you are having.\" \"How much bleeding is there?\"     - SPOTTING: spotting, or pinkish / brownish mucous discharge; does not fill panti-liner or pad     - MILD:  less than 1 pad / hour; less than patient's usual menstrual bleeding    - MODERATE: 1-2 pads / hour; 1 menstrual cup every 6 hours; small-medium blood clots (e.g., pea, grape, small coin)    - SEVERE: soaking 2 or more pads/hour for 2 or more hours; 1 menstrual cup every 2 hours; bleeding not contained by pads or continuous red blood from vagina; large blood clots (e.g., golf ball, large coin)       Brown/pink tinge first occurrence this morning in underwear, this afternoon,very small amount with wiping.  3. ABDOMINAL PAIN SEVERITY: If present, ask: \"How bad is it?\"  (e.g., Scale 1-10; mild, moderate, or " "severe)    - MILD (1-3): doesn't interfere with normal activities, abdomen soft and not tender to touch     - MODERATE (4-7): interferes with normal activities or awakens from sleep, tender to touch     - SEVERE (8-10): excruciating pain, doubled over, unable to do any normal activities      Denies pelvic/abdominal pain  4. PREGNANCY: \"Do you know how many weeks or months pregnant you are?\" \"When was the first day of your last normal menstrual period?\"      10w6d  5. HEMODYNAMIC STATUS: \"Are you weak or feeling lightheaded?\" If Yes, ask: \"Can you stand and walk normally?\"       denies  6. OTHER SYMPTOMS: \"What other symptoms are you having with the bleeding?\" (e.g., passed tissue, vaginal discharge, fever, menstrual-type cramps)      Very mild vaginal irritation/itching.    Protocols used: Pregnancy - Vaginal Bleeding Less Than 20 Weeks EGA-ADULT-OH    "

## 2024-09-30 NOTE — TELEPHONE ENCOUNTER
Regarding: some brown pink spoting 10w.  ----- Message from Jennifer ALMANZA sent at 9/30/2024  3:36 PM EDT -----  Pt sent a myc message that she was having some brown pink spotting. She also was dx with a yeast infection recently. She finished up treatment. She said today she started having brown pink spotting again with some vaginal irritation. Not sure if it is another yeast infection?

## 2024-10-02 NOTE — PROGRESS NOTES
"  OB INTAKE INTERVIEW  Patient is 24 y.o. who presents for OB intake at 11.3wks  She is accompanied by   during this encounter  The father of her baby (Dakota) is involved in the pregnancy and is 27 years old.      Last Menstrual Period: 2024  Stopped birth control 2023-cycles have been irregular after stopping OCP.  Reports she had spotting that started on 24 but then was light for a few days after before bleeding flow became heavier 24. She reports her menstrual cycles are irregular starts spotting on day 25 and heavier flow at day 28-last 2 days, last 11-13 days.   Ultrasound: Measured 8 weeks 3 days on 2024, approx every 25-28 days.   Estimated Date of Delivery: 2025-dating based on ultrasound done at ER on 2024, US was consistent with US done on 24    Signs/Symptoms of Pregnancy  Minimal nausea states, \"has not been that bad.\" try to eat 5-6 small meals a day, increase protein with meals/snacks, in order to keep stomach full at all times. Try bland foods like plain crackers, toast as well as carbonated drinks like gingerale. Hard peppermint or franky candy, is a natural treatment for nausea,  B- pops  with B6 ( available at the pharmacy), B6 25 mg in the AM and 25 mg in PM. May combine with Unisom 12.5 mg at night. Unisom may cause drowsiness.  High complex carbohydrate snack  before bedtime.   Constipation YES, occasional. Encouraged to increase fiber and fluids (at least 8-10 cups daily), well balanced diet to include (fruits, vegetables, whole grain breads), 30 minute walk daily and directed to safe OTC medication list. Recommend Colace (mild stool softener).  Headaches YES, occasional \"dull \" headaches. Not migraine related.   Hydration helps to relieve headaches.   Recommend Tylenol with caffeine PRN for first line headache treatment in pregnancy. Also encourage adequate hydration.     Cramping/spotting YES, reports she has had vaginal bleeding since her " LMP.  Reports light brown pink spotting on 24 post intercourse and post treatment of yeast infection. Reports she had some pink/brown spotting this morning with wiping. Denies any pelvic pain, has had mild cramping since her last period.   PICA cravings no    Diabetes-  There is no height or weight on file to calculate BMI.  If patient has 1 or more, please order early 1 hour GTT  History of GDM no  BMI >35 no  History of PCOS or current metformin use no  History of LGA/macrosomic infant (4000g/9lbs) no    If patient has 2 or more, please order early 1 hour GTT  BMI>30 no  AMA no  First degree relative with type 2 diabetes no  History of chronic HTN, hyperlipidemia, elevated A1C no  High risk race (, , ,  or ) no    Hypertension- if you answer yes to any of the following, please order baseline preeclampsia labs (cbc, comprehensive metabolic panel, urine protein creatinine ratio, uric acid)  History of of chronic HTN no  History of gestational HTN no  History of preeclampsia, eclampsia, or HELLP syndrome no  History of diabetes no  History of lupus, autoimmune disease, kidney disease YES, Vitiligo -pre-eclampsia panel ordered.     Thyroid- if yes order TSH with reflex T4  History of thyroid disease no    Bleeding Disorder or Hx of DVT-patient or first degree relative with history of. Order the following if not done previously.   (Factor V, antithrombin III, prothrombin gene mutation, protein C and S Ag, lupus anticoagulant, anticardiolipin, beta-2 glycoprotein)   no    OB/GYN-  History of abnormal pap smear no       Date of last pap smear 2023 NILM   History of HPV no  History of Herpes/HSV no  History of other STI (gonorrhea, chlamydia, trich) no  History of prior  no  History of prior  no  History of  delivery prior to 36 weeks 6 days no  History of blood transfusion no  Ok for blood transfusion yes     Substance screening-    History of tobacco use no  Currently using tobacco no  Substance Use Screen Level     MRSA Screening-   Does the pt have a hx of MRSA? no    Immunizations:  Influenza vaccine given this season No  Discussed Tdap vaccine yes  Discussed COVID Vaccine yes    Genetic/MFM-  Do you or your partner have a history of any of the following in yourselves or first degree relatives?  Cystic fibrosis no  Spinal muscular atrophy no  Hemoglobinopathy/Sickle Cell/Thalassemia no  Fragile X Intellectual Disability no    If yes, discuss Carrier Screening and recommend consultation with MFM/Genetic Counseling and place specific [facility] Referral for.    If no, discuss Carrier Screening being completed once in a lifetime as a standard of care lab test. Place orders for Cystic Fibrosis Gene Test (PVE390) and Spinal Muscular Atrophy DNA ([id number])  Would like to verify coverage with insurance before having ordered. [facility] cost option sheet provided Pt will call if she desires testing to be ordered.     Appointment for Nuchal Translucency Ultrasound at [facility] scheduled for 10/18/2024  Beatriz's sister had a son (nephew) that  inutero with a cystic hygroma and a daughter (niece) born with a cleft palate and larynx     Interview education  [facility] Ess[facility] Book reviewed, discussed and attached to their AVS yes      Ambulatory Referral to  placed  EPDS: 7  Pt expressed she has chris anxious with regards to pregnancy. Pt has been having ongoing spotting since knowledge of pregnancy, and cause for added concern. She  denies any past history of depression.       Prenatal lab work scripts YES  Preferred Lab: [facility]   Extra labs ordered:  Pre-eclampsia panel     Aspirin/Preeclampsia Screen    Risk Level Risk Factor Recommendation   LOW Prior Uncomplicated full-term delivery no No Aspirin recommendation        MODERATE Nulliparity YES Recommend low-dose aspirin if     BMI>30 no 2 or more moderate risk factors    Family  History Preeclampsia (mother/sister) YES, Beatriz's sister had pre-eclampsia in (3) pregnancies      35yr old or greater no     Black Race, Concern for SDOH/Low Socioeconomic no     IVF Pregnancy  no     Personal History Risks (low birth weight, prior adverse preg outcome, >10yr preg interval) no         HIGH History of Preeclampsia no Recommend low-dose aspirin if     Multifetal gestation no 1 or more high risk factors    Chronic HTN no     Type 1 or 2 Diabetes no     Renal Disease no     Autoimmune Disease  yes-Vitiligo       Contraindications to ASA therapy:  NSAID/ ASA allergy: no  Nasal polyps: no  Asthma with history of ASA induced bronchospasm: no  Relative contraindications:  History of GI bleed: no  Active peptic ulcer disease: no  Severe hepatic dysfunction: no    Patient should be recommended to take ASA 162mg during this pregnancy from 12-36wks to lower her risk of preeclampsia:   High Risk-underlying risk factor includes Vitiligo, Nulliparity and family history pre-eclampsia in her sister in  3 pregnancies.    Initiation of low dose ASA recommended, to be reviewed and discussed with patient       The patient has a history now or in prior pregnancy notable for:  Primigravida       Details that I feel the provider should be aware of:   Beatriz is a current patient of [facility]. This is her first pregnancy. This is a planned and desired pregnancy.     Medical history includes:  Vitiligo-   Migraines without Aura-Prescribed Maxalt in past, aware contraindicated in pregnancy.Recommend Tylenol with caffeine PRN for first line headache treatment in pregnancy. Also encourage adequate hydration.     History of cold sores-Treated with valtrex as needed.     PN1 visit scheduled. The patient was oriented to our practice, the navigator role, reviewed delivering physicians and [facility] for Delivery. All questions were answered.    Interviewed by: COURTNEY Nielson RN

## 2024-10-02 NOTE — PATIENT INSTRUCTIONS
Meka Sánchez,     It was so nice getting to know you today. Remember if you have an urgent or time sensitive concern, please call the practice phone number so a clinical triage team member can review your symptoms and get in touch with our on call provider if necessary. If you have general questions or need help navigating our services please REPLY to this message so it comes directly to me and I will respond between other patients. If I am out of the office for any reason, another nurse navigator may reach out to help you. Our Pregnancy Essential Guide is a great online resource--please use the link below.     [facility] Pregnancy Essentials Guide  [facility] ([email or web address])     Again, Congratulations and Thank You for choosing [facility]. I look forward to helping you through this journey. Reach out-

## 2024-10-04 ENCOUNTER — PATIENT OUTREACH (OUTPATIENT)
Dept: OBGYN CLINIC | Facility: CLINIC | Age: 24
End: 2024-10-04

## 2024-10-04 ENCOUNTER — INITIAL PRENATAL (OUTPATIENT)
Dept: OBGYN CLINIC | Facility: CLINIC | Age: 24
End: 2024-10-04

## 2024-10-04 VITALS
BODY MASS INDEX: 24.24 KG/M2 | WEIGHT: 142 LBS | HEIGHT: 64 IN | DIASTOLIC BLOOD PRESSURE: 70 MMHG | SYSTOLIC BLOOD PRESSURE: 118 MMHG

## 2024-10-04 DIAGNOSIS — Z3A.11 11 WEEKS GESTATION OF PREGNANCY: Primary | ICD-10-CM

## 2024-10-04 DIAGNOSIS — Z86.2 HISTORY OF AUTOIMMUNE DISORDER: ICD-10-CM

## 2024-10-04 DIAGNOSIS — Z36.89 ENCOUNTER FOR OTHER SPECIFIED ANTENATAL SCREENING: ICD-10-CM

## 2024-10-04 PROCEDURE — OBC: Performed by: PHYSICIAN ASSISTANT

## 2024-10-04 NOTE — PROGRESS NOTES
SW referred for initial prenatal assessment. Patient is , [id number] with an ALIDA of 25. Patient presented with FOB ( Dakota) today, agreeable to meeting with SW.    Patient reports this is a planned pregnancy. She and FOB both work and drive. She denies needing information for MA/WIC/SNAP, parenting education, or baby supply resources today.    Patient denies current or h/o mental health, substance use, DV/IPV, CYS involvement, and legal concerns. She indicates good social support from FOB, family, and friends. She enjoys doing pilates, spending time with friends, playing pickleball and volleyball, shopping, and prayer for stress relief.     No SW needs identified at this time, SW closing referral. Please re-refer as needed.

## 2024-10-11 LAB
EXTERNAL ANTIBODY SCREEN: NORMAL
EXTERNAL HEMATOCRIT: 39.2 %
EXTERNAL HEMOGLOBIN: 12.9 G/DL
EXTERNAL HEPATITIS B SURFACE ANTIGEN: NEGATIVE
EXTERNAL HIV-1/2 AB-AG: NORMAL
EXTERNAL PLATELET COUNT: 178 K/ΜL
EXTERNAL RH FACTOR: POSITIVE
EXTERNAL RUBELLA IGG QUANTITATION: NORMAL
EXTERNAL SYPHILIS TOTAL IGG/IGM SCREENING: NONREACTIVE

## 2024-10-14 LAB
ABO GROUP BLD: ABNORMAL
ALBUMIN SERPL-MCNC: 4.2 G/DL (ref 4–5)
ALP SERPL-CCNC: 57 IU/L (ref 44–121)
ALT SERPL-CCNC: 12 IU/L (ref 0–32)
APPEARANCE UR: ABNORMAL
AST SERPL-CCNC: 17 IU/L (ref 0–40)
BACTERIA UR CULT: ABNORMAL
BACTERIA UR CULT: NORMAL
BACTERIA URNS QL MICRO: ABNORMAL
BASOPHILS # BLD AUTO: 0 X10E3/UL (ref 0–0.2)
BASOPHILS NFR BLD AUTO: 0 %
BILIRUB SERPL-MCNC: 0.5 MG/DL (ref 0–1.2)
BILIRUB UR QL STRIP: NEGATIVE
BLD GP AB SCN SERPL QL: NEGATIVE
BUN SERPL-MCNC: 8 MG/DL (ref 6–20)
BUN/CREAT SERPL: 14 (ref 9–23)
C TRACH RRNA SPEC QL NAA+PROBE: NEGATIVE
CALCIUM SERPL-MCNC: 9.6 MG/DL (ref 8.7–10.2)
CASTS URNS QL MICRO: ABNORMAL /LPF
CHLORIDE SERPL-SCNC: 101 MMOL/L (ref 96–106)
CO2 SERPL-SCNC: 23 MMOL/L (ref 20–29)
COLOR UR: YELLOW
CREAT SERPL-MCNC: 0.58 MG/DL (ref 0.57–1)
CREAT UR-MCNC: 66.2 MG/DL
EGFR: 128 ML/MIN/1.73
EOSINOPHIL # BLD AUTO: 0 X10E3/UL (ref 0–0.4)
EOSINOPHIL NFR BLD AUTO: 1 %
EPI CELLS #/AREA URNS HPF: >10 /HPF (ref 0–10)
ERYTHROCYTE [DISTWIDTH] IN BLOOD BY AUTOMATED COUNT: 12.1 % (ref 11.7–15.4)
GLOBULIN SER-MCNC: 2.8 G/DL (ref 1.5–4.5)
GLUCOSE SERPL-MCNC: 77 MG/DL (ref 70–99)
GLUCOSE UR QL: NEGATIVE
HBV SURFACE AG SERPL QL IA: NEGATIVE
HCT VFR BLD AUTO: 39.2 % (ref 34–46.6)
HCV AB S/CO SERPL IA: NON REACTIVE
HGB BLD-MCNC: 12.9 G/DL (ref 11.1–15.9)
HGB UR QL STRIP: NEGATIVE
HIV 1+2 AB+HIV1 P24 AG SERPL QL IA: NON REACTIVE
IMM GRANULOCYTES # BLD: 0 X10E3/UL (ref 0–0.1)
IMM GRANULOCYTES NFR BLD: 0 %
KETONES UR QL STRIP: NEGATIVE
LEUKOCYTE ESTERASE UR QL STRIP: ABNORMAL
LYMPHOCYTES # BLD AUTO: 1.3 X10E3/UL (ref 0.7–3.1)
LYMPHOCYTES NFR BLD AUTO: 18 %
MCH RBC QN AUTO: 32.4 PG (ref 26.6–33)
MCHC RBC AUTO-ENTMCNC: 32.9 G/DL (ref 31.5–35.7)
MCV RBC AUTO: 99 FL (ref 79–97)
MICRO URNS: ABNORMAL
MONOCYTES # BLD AUTO: 0.5 X10E3/UL (ref 0.1–0.9)
MONOCYTES NFR BLD AUTO: 8 %
N GONORRHOEA RRNA SPEC QL NAA+PROBE: NEGATIVE
NEUTROPHILS # BLD AUTO: 5 X10E3/UL (ref 1.4–7)
NEUTROPHILS NFR BLD AUTO: 73 %
NITRITE UR QL STRIP: NEGATIVE
PH UR STRIP: 7.5 [PH] (ref 5–7.5)
PLATELET # BLD AUTO: 178 X10E3/UL (ref 150–450)
POTASSIUM SERPL-SCNC: 4.3 MMOL/L (ref 3.5–5.2)
PROT SERPL-MCNC: 7 G/DL (ref 6–8.5)
PROT UR QL STRIP: NEGATIVE
PROT UR-MCNC: 5.3 MG/DL
PROT/CREAT UR: 80 MG/G CREAT (ref 0–200)
RBC # BLD AUTO: 3.98 X10E6/UL (ref 3.77–5.28)
RBC #/AREA URNS HPF: ABNORMAL /HPF (ref 0–2)
RH BLD: POSITIVE
RPR SER QL: NON REACTIVE
RUBV IGG SERPL IA-ACNC: 2.03 INDEX
SL AMB INTERPRETATION: NORMAL
SODIUM SERPL-SCNC: 136 MMOL/L (ref 134–144)
SP GR UR: 1.02 (ref 1–1.03)
URATE SERPL-MCNC: 3.4 MG/DL (ref 2.6–6.2)
UROBILINOGEN UR STRIP-ACNC: 0.2 MG/DL (ref 0.2–1)
WBC # BLD AUTO: 6.9 X10E3/UL (ref 3.4–10.8)
WBC #/AREA URNS HPF: ABNORMAL /HPF (ref 0–5)

## 2024-10-15 ENCOUNTER — ROUTINE PRENATAL (OUTPATIENT)
Dept: PERINATAL CARE | Facility: OTHER | Age: 24
End: 2024-10-15
Payer: COMMERCIAL

## 2024-10-15 VITALS
WEIGHT: 142.6 LBS | DIASTOLIC BLOOD PRESSURE: 70 MMHG | HEART RATE: 59 BPM | SYSTOLIC BLOOD PRESSURE: 118 MMHG | BODY MASS INDEX: 24.34 KG/M2 | HEIGHT: 64 IN

## 2024-10-15 DIAGNOSIS — O35.2XX0 HEREDITARY DISEASE IN FAMILY POSSIBLY AFFECTING FETUS, AFFECTING MANAGEMENT OF MOTHER IN PREGNANCY, SINGLE OR UNSPECIFIED FETUS: Primary | ICD-10-CM

## 2024-10-15 DIAGNOSIS — Z3A.13 13 WEEKS GESTATION OF PREGNANCY: ICD-10-CM

## 2024-10-15 DIAGNOSIS — Z36.82 NUCHAL TRANSLUCENCY OF FETUS ON PRENATAL ULTRASOUND: ICD-10-CM

## 2024-10-15 PROCEDURE — 76813 OB US NUCHAL MEAS 1 GEST: CPT | Performed by: OBSTETRICS & GYNECOLOGY

## 2024-10-15 PROCEDURE — 99203 OFFICE O/P NEW LOW 30 MIN: CPT | Performed by: OBSTETRICS & GYNECOLOGY

## 2024-10-15 NOTE — LETTER
2024     Jennifer Horn PA-C  [address]  [address]  [facility]     Patient: Beatriz Sánchez   YOB: 2000   Date of Visit: 10/15/2024       Dear Dr. Horn:    Thank you for referring Beatriz Sánchez to me for evaluation. Below are my notes for this consultation.    If you have questions, please do not hesitate to call me. I look forward to following your patient along with you.         Sincerely,        Yamileth Charles MD        CC: No Recipients    Yamileth Charles MD  10/16/2024  3:09 PM  Sign when Signing Visit  OFFICE CONSULT  Referring physician:   Jennifer Horn Pa-c  [address]  [address]  [place], [place]       Dear Jennifer Horn      Thank you for requesting a  consultation on your patient Ms. Beatriz Sánchez for the following indications:  Genetic screening    History  Beatriz is on prenatal vitamins and has a prescription for Valtrex as needed for cold sores. She has no allergies.  History includes migraine.  Surgical history includes wisdom tooth extraction.  Family history of thyroid disease in both her mother and sister.  One sister had 1 child with a cleft palate and cleft larynx and another male fetal demise at 16 weeks after being diagnosed with a cystic hygroma. That same sister also has had preeclampsia complicating 3 of her pregnancies.  She is not close with her father who she thinks has hypertension.    Ultrasound findings: The ultrasound shows a fetus concordant with dates. The nasal bone and nuchal translucency appears normal. No malformations are seen on today's early ultrasound.     The patient was informed of the findings and counseled about the limitations of the exam in detecting all forms of fetal congenital abnormalities.    She does not report any vaginal bleeding or uterine cramping or contractions.      Specific counseling was provided on the following problems:  We discussed the options for  genetic screening which include invasive testing on the fetal placenta or on fetal skin cells within the amniotic fluid and compared this to noninvasive testing which includes cell free DNA screening.  We reviewed the risks, the benefits and the limitations of each.  In the end patient chose to complete the cell free DNA screen but needs to wait for prior authorization.   With a first generation family member having preeclampsia I recommend Beatriz start baby aspirin 162 mg daily till 36 weeks and 0 days.       Future tests recommended:  The results of her NIPT will return in 7-10 days.  Screening for spina bifida with an MSAFP screen is a future test that can be prescribed through her OB office.  This blood work should be drawn preferably at 16 to 18 weeks so that the results return prior to her next scan.  The test though can be run until 21 weeks and 6 days if needed.    Future ultrasounds ordered today:   Fetal Level II ultrasound imaging is recommended at 19-20 weeks' gestation.    Pre visit time reviewing her records   5 minutes  Face to face time 10 minutes  Post visit time on documentation of note, updating her problem list, adding orders and prescriptions 15 minutes.  Procedures that were completed today were charged separately.   The level of decision making was low level complexity.    Yamileth Charles MD

## 2024-10-16 ENCOUNTER — TELEPHONE (OUTPATIENT)
Age: 24
End: 2024-10-16

## 2024-10-16 PROBLEM — O35.2XX0 HEREDITARY DISEASE IN FAMILY POSSIBLY AFFECTING FETUS: Status: ACTIVE | Noted: 2024-10-16

## 2024-10-16 RX ORDER — ASPIRIN 81 MG/1
162 TABLET, CHEWABLE ORAL DAILY
Start: 2024-10-16

## 2024-10-16 NOTE — PROGRESS NOTES
OFFICE CONSULT  Referring physician:   Jennifer Horn Pa-c  [address]  [address]  [place] [address]      Dear Jennifer Horn      Thank you for requesting a  consultation on your patient Ms. Beatriz Sánchez for the following indications:  Genetic screening    History  Beatriz is on prenatal vitamins and has a prescription for Valtrex as needed for cold sores. She has no allergies.  History includes migraine.  Surgical history includes wisdom tooth extraction.  Family history of thyroid disease in both her mother and sister.  One sister had 1 child with a cleft palate and cleft larynx and another male fetal demise at 16 weeks after being diagnosed with a cystic hygroma. That same sister also has had preeclampsia complicating 3 of her pregnancies.  She is not close with her father who she thinks has hypertension.    Ultrasound findings: The ultrasound shows a fetus concordant with dates. The nasal bone and nuchal translucency appears normal. No malformations are seen on today's early ultrasound.     The patient was informed of the findings and counseled about the limitations of the exam in detecting all forms of fetal congenital abnormalities.    She does not report any vaginal bleeding or uterine cramping or contractions.      Specific counseling was provided on the following problems:  We discussed the options for genetic screening which include invasive testing on the fetal placenta or on fetal skin cells within the amniotic fluid and compared this to noninvasive testing which includes cell free DNA screening.  We reviewed the risks, the benefits and the limitations of each.  In the end patient chose to complete the cell free DNA screen but needs to wait for prior authorization.   With a first generation family member having preeclampsia I recommend Beatriz start baby aspirin 162 mg daily till 36 weeks and 0 days.       Future tests recommended:  The results of her NIPT will return in  7-10 days.  Screening for spina bifida with an MSAFP screen is a future test that can be prescribed through her OB office.  This blood work should be drawn preferably at 16 to 18 weeks so that the results return prior to her next scan.  The test though can be run until 21 weeks and 6 days if needed.    Future ultrasounds ordered today:   Fetal Level II ultrasound imaging is recommended at 19-20 weeks' gestation.    Pre visit time reviewing her records   5 minutes  Face to face time 10 minutes  Post visit time on documentation of note, updating her problem list, adding orders and prescriptions 15 minutes.  Procedures that were completed today were charged separately.   The level of decision making was low level complexity.    Yamileth Charles MD

## 2024-10-16 NOTE — TELEPHONE ENCOUNTER
TC to Beatriz ( as an identifier) explained the following to Beatriz  Insurance Authorization for your [id number] genetic screening has been approved. Auth # is [id number] with dates of service 10/16/24 - 2025  Diagnosis code Z36.0   Insurance Authorization is not a guarantee of payment and final determination is based on your member benefits, appropriateness of service provided and eligibility at time of services rendered and claim received. Please follow up with [facility] for your OOP copay.    Nurse visit 2024 at 11:30 am at our [facility] location as per patient's request.

## 2024-10-17 NOTE — PATIENT INSTRUCTIONS
Thank you for choosing us for your  care today.  If you have any questions about your ultrasound or care, please do not hesitate to contact us or your primary obstetrician.        Some general instructions for your pregnancy are:    Exercise: Aim for 150 minutes per week of regular exercise.  Walking is great!  Nutrition: Choose healthy sources of calcium, iron, and protein.  Avoid ultraprocessed foods and added sugar.  Learn about Preeclampsia: preeclampsia is a common, potentially serious high blood pressure complication in pregnancy.  A blood pressure of 140mmHg (systolic or top number) or 90mmHg (diastolic or bottom number) should be evaluated by your doctor.  Aspirin is sometimes prescribed in early pregnancy to prevent preeclampsia in women with risk factors - ask your obstetrician if you should be on this medication.  For more resources, visit:  [email or web address]  If you smoke, please try to quit completely but also try to reduce your smoking by as much as possible (as soon as possible).  Do not vape.  Please also avoid cannabis products.  Other warning signs to watch out for in pregnancy or postpartum: chest pain, obstructed breathing or shortness of breath, seizures, thoughts of hurting yourself or your baby, bleeding, a painful or swollen leg, fever, or headache (see [facility] POST-BIRTH Warning Signs campaign).  If these happen call 911.  Itching is also not normal in pregnancy and if you experience this, especially over your hands and feet, potentially worse at night, notify your doctors.     You elected to have non-invasive prenatal screening (NIPS). This involves a blood test to check for the four most common genetic syndromes (Trisomy 21, Trisomy 13, Trisomy 18, and sex chromosome abnormalities). It also MAY report the biologic sex of the fetus if you opted to learn this information. You can call our office to verbally review results to avoid inadvertently  learning this information via userfox, if desired. Results will be visible in your [facility] portal 5-7 business days from when the test is drawn. Please follow all instructions regarding insurance cost/coverage provided to you today. Please contact our office with any concerns or questions. You will need spina bifida screening (called MSAFP) for the baby beginning at 15 weeks gestation, which will be ordered by your obstetrician's office. This test allows for earlier detection of spina bifida than is possible by ultrasound, and is advised in all pregnancies.

## 2024-10-18 ENCOUNTER — CLINICAL SUPPORT (OUTPATIENT)
Dept: PERINATAL CARE | Facility: OTHER | Age: 24
End: 2024-10-18
Payer: COMMERCIAL

## 2024-10-18 ENCOUNTER — INITIAL PRENATAL (OUTPATIENT)
Dept: OBGYN CLINIC | Facility: CLINIC | Age: 24
End: 2024-10-18
Payer: COMMERCIAL

## 2024-10-18 VITALS
HEIGHT: 64 IN | BODY MASS INDEX: 24.38 KG/M2 | WEIGHT: 142.8 LBS | DIASTOLIC BLOOD PRESSURE: 62 MMHG | SYSTOLIC BLOOD PRESSURE: 118 MMHG

## 2024-10-18 DIAGNOSIS — Z36.0 ENCOUNTER FOR ANTENATAL SCREENING FOR CHROMOSOMAL ANOMALIES: Primary | ICD-10-CM

## 2024-10-18 DIAGNOSIS — Z36.1 NEED FOR MATERNAL SERUM ALPHA-PROTEIN (MSAFP) SCREENING: ICD-10-CM

## 2024-10-18 DIAGNOSIS — Z11.3 ROUTINE SCREENING FOR STI (SEXUALLY TRANSMITTED INFECTION): ICD-10-CM

## 2024-10-18 DIAGNOSIS — Z3A.13 13 WEEKS GESTATION OF PREGNANCY: ICD-10-CM

## 2024-10-18 DIAGNOSIS — Z34.01 ENCOUNTER FOR SUPERVISION OF NORMAL FIRST PREGNANCY IN FIRST TRIMESTER: Primary | ICD-10-CM

## 2024-10-18 LAB
EXTERNAL CHLAMYDIA SCREEN: NORMAL
EXTERNAL GONORRHEA SCREEN: NORMAL
SL AMB  POCT GLUCOSE, UA: NORMAL
SL AMB POCT URINE PROTEIN: NORMAL

## 2024-10-18 PROCEDURE — 81002 URINALYSIS NONAUTO W/O SCOPE: CPT | Performed by: PHYSICIAN ASSISTANT

## 2024-10-18 PROCEDURE — 36415 COLL VENOUS BLD VENIPUNCTURE: CPT | Performed by: OBSTETRICS & GYNECOLOGY

## 2024-10-18 PROCEDURE — PNV: Performed by: PHYSICIAN ASSISTANT

## 2024-10-18 NOTE — PROGRESS NOTES
Patient chose to have [facility] [id number] Non-Invasive Prenatal Screen [id number] MT21 PLUS Core + SCA, NO fetal sex.  Patient choose to be billed through insurance.     Patient given brochure and is aware [facility] will contact patient's insurance and coordinate coverage.  Provided [facility] contact information. General inquiries [phone], Cost estimates [phone] and [facility] Billing [phone]. Website [email or web address]     Blood collection tubes labeled with patient identifiers (name, medical record number, and date of birth).     Filled out [facility] order form. Patient chose to have blood drawn in our office at time of visit. NIPS was drawn from right arm with a butterfly needle by ROME Clay RN. .      If patient chose to have blood work drawn at a [facility] lab we requested patient notify MFM (via phone call or Lesson[employer] message) when blood collected so office can follow up on results.       Maternal Fetal Medicine will have results in approximately 5-7 business days and will call patient or notify via [employer].  Patient aware viewing lab result online will reveal fetal sex if ordered.    Patient verbalized understanding of all instructions and no questions at this time.

## 2024-10-18 NOTE — PROGRESS NOTES
"Routine Prenatal Visit  [facility] - [address], [address], [place] [address]    Assessment/Plan:  Beatriz is a 24 y.o. year old  at 13w3d who presents for routine prenatal visit.     1. Encounter for supervision of normal first pregnancy in first trimester  2. 13 weeks gestation of pregnancy  Assessment & Plan:  Pap up to date, STD cultures done.   Reviewed AFP to evaluate for ONTD. Script given. Aware to have done between 15-18 weeks. Aware to check insurance for coverage prior to having one.    Has Level II ultrasound scheduled.   Reviewed nulliparous with family hx of preeclampsia. Recommend  mg until 36 weeks. Has script.   Continue routine prenatal care.   Return to office for ob check in 4 weeks.     Orders:  -     POCT urine dip  -     Alpha fetoprotein, maternal; Future; Expected date: 10/29/2024  -     Alpha fetoprotein, maternal  3. Need for maternal serum alpha-protein (MSAFP) screening  -     Alpha fetoprotein, maternal; Future; Expected date: 10/29/2024  -     Alpha fetoprotein, maternal  4. Routine screening for STI (sexually transmitted infection)  -     Chlamydia/GC GAMALIEL, Confirmation      Next OB Visit 4 weeks.    Subjective:     CC: Prenatal care    Beatriz Sánchez is a 24 y.o.  female who presents for routine prenatal care at 13w3d.  Pregnancy ROS: Good FM, No N/V, HA, cramping, VB, LOF, edema, domestic violence, or smoking. Tolerating PNV.        The following portions of the patient's history were reviewed and updated as appropriate: allergies, current medications, past family history, past medical history, obstetric history, gynecologic history, past social history, past surgical history and problem list.      Objective:  /62 (BP Location: Right arm, Patient Position: Sitting, Cuff Size: Standard)   Ht 5' 4\" (1.626 m)   Wt 64.8 kg (142 lb 12.8 oz)   LMP  (Exact Date)   BMI 24.51 kg/m²   Pregravid Weight/BMI: 62.1 kg (137 lb) (BMI " 23.50)  Current Weight: 64.8 kg (142 lb 12.8 oz)   Total Weight Gain: 2.631 kg (5 lb 12.8 oz)   Pre-Emerald Vitals      Flowsheet Row Most Recent Value   Prenatal Assessment    Fetal Heart Rate 156   Fundal Height (cm) 13 cm   Movement Absent   Prenatal Vitals    Blood Pressure 118/62   Weight - Scale 64.8 kg (142 lb 12.8 oz)   Urine Albumin/Glucose    Dilation/Effacement/Station    Vaginal Drainage    Edema    LLE Edema None   RLE Edema None   Facial Edema None             Physical Exam  Constitutional:       Appearance: Normal appearance. She is well-developed.   Neck:      Thyroid: No thyroid mass or thyromegaly.   Cardiovascular:      Rate and Rhythm: Normal rate and regular rhythm.      Heart sounds: Normal heart sounds. No murmur heard.     No friction rub. No gallop.   Pulmonary:      Effort: Pulmonary effort is normal. No respiratory distress.      Breath sounds: Normal breath sounds. No wheezing or rales.   Chest:   Breasts:     Right: Normal. No swelling, bleeding, inverted nipple, mass, nipple discharge, skin change or tenderness.      Left: Normal. No swelling, bleeding, inverted nipple, mass, nipple discharge, skin change or tenderness.   Abdominal:      General: Bowel sounds are normal. There is no distension.      Palpations: Abdomen is soft. There is no mass.      Tenderness: There is no abdominal tenderness. There is no guarding or rebound.   Genitourinary:     Labia:         Right: No rash, tenderness, lesion or injury.         Left: No rash, tenderness, lesion or injury.       Urethra: No prolapse, urethral pain, urethral swelling or urethral lesion.      Vagina: No signs of injury. No vaginal discharge, erythema, tenderness or bleeding.      Cervix: No cervical motion tenderness, discharge or friability.      Uterus: Not deviated, not enlarged and not tender.       Adnexa:         Right: No mass, tenderness or fullness.          Left: No mass, tenderness or fullness.     Musculoskeletal:       Cervical back: Neck supple.   Lymphadenopathy:      Cervical: No cervical adenopathy.      Upper Body:      Right upper body: No supraclavicular or axillary adenopathy.      Left upper body: No supraclavicular or axillary adenopathy.   Skin:     General: Skin is warm and dry.      Coloration: Skin is not pale.      Findings: No erythema or rash.   Neurological:      Mental Status: She is alert and oriented to person, place, and time.   Psychiatric:         Behavior: Behavior normal.         Thought Content: Thought content normal.         Judgment: Judgment normal.

## 2024-10-21 PROBLEM — Z3A.13 13 WEEKS GESTATION OF PREGNANCY: Status: ACTIVE | Noted: 2024-10-21

## 2024-10-21 NOTE — ASSESSMENT & PLAN NOTE
Pap up to date, STD cultures done.   Reviewed AFP to evaluate for ONTD. Script given. Aware to have done between 15-18 weeks. Aware to check insurance for coverage prior to having one.    Has Level II ultrasound scheduled.   Reviewed nulliparous with family hx of preeclampsia. Recommend  mg until 36 weeks. Has script.   Continue routine prenatal care.   Return to office for ob check in 4 weeks.

## 2024-10-22 LAB
C TRACH RRNA SPEC QL NAA+PROBE: NEGATIVE
N GONORRHOEA RRNA SPEC QL NAA+PROBE: NEGATIVE

## 2024-10-28 ENCOUNTER — CLINICAL SUPPORT (OUTPATIENT)
Dept: PERINATAL CARE | Facility: OTHER | Age: 24
End: 2024-10-28
Payer: COMMERCIAL

## 2024-10-28 ENCOUNTER — TELEPHONE (OUTPATIENT)
Dept: PERINATAL CARE | Facility: OTHER | Age: 24
End: 2024-10-28

## 2024-10-28 DIAGNOSIS — Z3A.14 14 WEEKS GESTATION OF PREGNANCY: ICD-10-CM

## 2024-10-28 DIAGNOSIS — Z36.0 ENCOUNTER FOR ANTENATAL SCREENING FOR CHROMOSOMAL ANOMALIES: Primary | ICD-10-CM

## 2024-10-28 LAB
CFDNA.FET/CFDNA.TOTAL SFR FETUS: ABNORMAL %
CITATION REF LAB TEST: ABNORMAL
FET 13+18+21+X+Y ANEUP PLAS.CFDNA: ABNORMAL
GA EST FROM CONCEPTION DATE: ABNORMAL D
GESTATIONAL AGE > 9:: YES
LAB DIRECTOR NAME PROVIDER: ABNORMAL
LAB DIRECTOR NAME PROVIDER: ABNORMAL
LABORATORY COMMENT REPORT: ABNORMAL
LIMITATIONS OF THE TEST: ABNORMAL
NEGATIVE PREDICTIVE VALUE: ABNORMAL
PERFORMANCE CHARACTERISTICS: ABNORMAL
REF LAB TEST METHOD: ABNORMAL
SL AMB NOTE:: ABNORMAL
TEST PERFORMANCE INFO SPEC: ABNORMAL

## 2024-10-28 PROCEDURE — 36415 COLL VENOUS BLD VENIPUNCTURE: CPT | Performed by: OBSTETRICS & GYNECOLOGY

## 2024-10-28 NOTE — PROGRESS NOTES
Patient chose to have [facility] [id number] Non-Invasive Prenatal Screen [id number] MT21 PLUS Core + SCA, NO fetal sex.  Patient choose to be billed through insurance.     Patient given brochure and is aware [facility] will contact patient's insurance and coordinate coverage.  Provided [facility] contact information. General inquiries [phone], Cost estimates [phone] and [facility] Billing [phone]. Website [email or web address].     Blood collection tubes labeled with patient identifiers (name, medical record number, and date of birth).     Filled out [facility] order form. Patient chose to have blood drawn in our office at time of visit. NIPS was drawn from left arm with a butterfly needle by ROME Clay RN. \  Specimen is a re-draw from a non-reportable result.      If patient chose to have blood work drawn at a [facility] lab we requested patient notify MFM (via phone call or [email or web address] message) when blood collected so office can follow up on results.       Maternal Fetal [facility] will have results in approximately 5-7 business days and will call patient or notify via [email or web address].  Patient aware viewing lab result online will reveal fetal sex if ordered.    Patient verbalized understanding of all instructions and no questions at this time.

## 2024-10-28 NOTE — TELEPHONE ENCOUNTER
Beatriz had her NIPT drawn on 10/18/24. No result was found in her chart today. I called [facility] and spoke to Lea, who confirmed that Beatriz's lab result was not reportable due to a possible technical issue or sample related issue. She was not sure why our office was not notified of this. I had her fax the report to our office to scan into Beatriz's chart.    I called Beatriz to inform her of this. She verbalized understanding. I offered her to come to our office this evening for a redraw due to her work schedule. She will come to the [facility] office for a blood draw at this time. She understands that her insurance will not be charged twice and that her prior authorization will cover the re-draw.

## 2024-11-04 ENCOUNTER — TELEPHONE (OUTPATIENT)
Dept: PERINATAL CARE | Facility: OTHER | Age: 24
End: 2024-11-04

## 2024-11-04 ENCOUNTER — TELEPHONE (OUTPATIENT)
Age: 24
End: 2024-11-04

## 2024-11-04 NOTE — TELEPHONE ENCOUNTER
Beatriz had her Mat 21 re-drawn in our office on 10/28/24 after her first sample failed to yield a result. I called [facility] and spoke to Neftali as a follow up. Per Neftali, the lab notified her that the expected date for the result should be this Wednesday or Thursday. I informed Beatriz of this and assured her that I will call [facility] on Wednesday for a follow up. She verbalized understanding.

## 2024-11-04 NOTE — TELEPHONE ENCOUNTER
Patient calling to follow up with NIPT redraw collected on 10/28. Was advised to call this morning if results were not received. Message to office clerical team to follow up with [facility] for test results.

## 2024-11-05 ENCOUNTER — PATIENT MESSAGE (OUTPATIENT)
Dept: OBGYN CLINIC | Facility: CLINIC | Age: 24
End: 2024-11-05

## 2024-11-05 ENCOUNTER — NURSE TRIAGE (OUTPATIENT)
Age: 24
End: 2024-11-05

## 2024-11-05 NOTE — TELEPHONE ENCOUNTER
"Regardin wk fell on SUN  ----- Message from Laisha CONDE sent at 2024  3:40 PM EST -----  Patient wrote in 16 wks fell on Sun \"I fell down my carpeted steps on  onto my bottom and left arm. I did my at home doppler right after and got a great heart beat. I just thought I should message you to let you know in case I need to be seen. My next appointment is next Friday.      I have not had any vaginal bleeding, cramping, or any other symptoms. I did fall pretty hard I am pretty sure I bruised my tailbone.\"    "

## 2024-11-05 NOTE — TELEPHONE ENCOUNTER
"16 w 0 days EDC 4/22/2025 Sunday afternoon she slipped on carpeted stairs wearing socks balance was off carrying glass and her dog got in the way. Landed on her bottom and broke fall with left arm. Slight arm bruise. Denies any bleeding, no contractions, no lof, no abd pain. Slight pain 1-2/10 in her bottom. Feels perfectly well, was not going to call but friend encouraged her to report. Advised to monitor and call back if any increased pain or concerning symptoms. Patient verbalzied understanding.  Next appointment OB 11/15.  ES[facility] Dr Espinoza- Agree. No further recommendations.       Reason for Disposition   Recent fall and no injury    Answer Assessment - Initial Assessment Questions  1. MECHANISM: \"How did the fall happen?\"      Slip down stairs , hit bottom and arm   2. DOMESTIC VIOLENCE AND ELDER ABUSE SCREENING: \"Did you fall because someone pushed you or tried to hurt you?\" If Yes, ask: \"Are you safe now?\"      denies  3. ONSET: \"When did the fall happen?\" (e.g., minutes, hours, or days ago)      Sunday afternoon   4. LOCATION: \"What part of the body hit the ground?\" (e.g., back, buttocks, head, hips, knees, hands, head, stomach)      Bottom and arm   5. INJURY: \"Did you hurt (injure) yourself when you fell?\" If Yes, ask: \"What did you injure? Tell me more about this?\" (e.g., body area; type of injury; pain severity)\"      Left Bruise arm from trying to break fall   6. PAIN: \"Is there any pain?\" If Yes, ask: \"How bad is the pain?\" (e.g., Scale 0-10; or none, mild,       Little soreness   7. SIZE: For cuts, bruises, or swelling, ask: \"How large is it?\" (e.g., inches or centimeters)       Small area   8. PREGNANCY: \"Is there any chance you are pregnant?\" \"When was your last menstrual period?\"      16w 0 d  9. OTHER SYMPTOMS: \"Do you have any other symptoms?\" (e.g., dizziness, fever, weakness; new-onset or worsening).       denies  10. CAUSE: \"What do you think caused the fall (or falling)?\" (e.g., dizzy spell, " tripped)        Dog came down steps with her and she slipped on carpet wearing socks    Protocols used: Falls and Falling-Adult-[place]     No restrictions

## 2024-11-07 ENCOUNTER — TELEPHONE (OUTPATIENT)
Age: 24
End: 2024-11-07

## 2024-11-07 NOTE — TELEPHONE ENCOUNTER
Patient calling in stating that she's looking for results on MT 21 Plus results that were completed on 10/28/24, pt was notified that the provider hasn't yet reviewed the results, once the provider reviews the results and gives further information, the patient will be notified, pt verbalized understanding, no further questions at this time.      2 2x2 cm leg wounds to left shin with mild surrounding erythema

## 2024-11-08 LAB
CFDNA.FET/CFDNA.TOTAL SFR FETUS: NORMAL %
CITATION REF LAB TEST: NORMAL
FET 13+18+21+X+Y ANEUP PLAS.CFDNA: NEGATIVE
FET CHR 21 TS PLAS.CFDNA QL: NEGATIVE
FET CHR 21 TS PLAS.CFDNA QL: NEGATIVE
FET MS X RISK WBC.DNA+CFDNA QL: NOT DETECTED
FET SEX PLAS.CFDNA DOSAGE CFDNA: NORMAL
FET TS 13 RISK PLAS.CFDNA QL: NEGATIVE
FET X + Y ANEUP RISK PLAS.CFDNA SEQ-IMP: NOT DETECTED
GA EST FROM CONCEPTION DATE: NORMAL D
GESTATIONAL AGE > 9:: YES
LAB DIRECTOR NAME PROVIDER: NORMAL
LAB DIRECTOR NAME PROVIDER: NORMAL
LABORATORY COMMENT REPORT: NORMAL
LIMITATIONS OF THE TEST: NORMAL
NEGATIVE PREDICTIVE VALUE: NORMAL
PERFORMANCE CHARACTERISTICS: NORMAL
POSITIVE PREDICTIVE VALUE: NORMAL
REF LAB TEST METHOD: NORMAL
SL AMB NOTE:: NORMAL
TEST PERFORMANCE INFO SPEC: NORMAL

## 2024-11-11 ENCOUNTER — TELEPHONE (OUTPATIENT)
Dept: PERINATAL CARE | Facility: OTHER | Age: 24
End: 2024-11-11

## 2024-11-11 NOTE — TELEPHONE ENCOUNTER
I left Beatriz a message as it appears she was able to view her [id number] result on her Mychart. I verified that her result was low risk. I instructed her to call with any questions.

## 2024-11-13 ENCOUNTER — RESULTS FOLLOW-UP (OUTPATIENT)
Dept: OBGYN CLINIC | Facility: CLINIC | Age: 24
End: 2024-11-13

## 2024-11-13 LAB
2ND TRIMESTER 4 SCREEN SERPL-IMP: NORMAL
AFP ADJ MOM SERPL: 1.1
AFP INTERP AMN-IMP: NORMAL
AFP INTERP SERPL-IMP: NORMAL
AFP INTERP SERPL-IMP: NORMAL
AFP SERPL-MCNC: 42.2 NG/ML
AGE AT DELIVERY: 26.7 YR
GA METHOD: NORMAL
GA: 16.9 WEEKS
IDDM PATIENT QL: NO
MULTIPLE PREGNANCY: NO
NEURAL TUBE DEFECT RISK FETUS: 8933 %

## 2024-11-15 ENCOUNTER — ROUTINE PRENATAL (OUTPATIENT)
Dept: OBGYN CLINIC | Facility: CLINIC | Age: 24
End: 2024-11-15
Payer: COMMERCIAL

## 2024-11-15 VITALS
WEIGHT: 146 LBS | BODY MASS INDEX: 24.92 KG/M2 | DIASTOLIC BLOOD PRESSURE: 72 MMHG | SYSTOLIC BLOOD PRESSURE: 112 MMHG | HEIGHT: 64 IN

## 2024-11-15 DIAGNOSIS — Z3A.17 17 WEEKS GESTATION OF PREGNANCY: Primary | ICD-10-CM

## 2024-11-15 LAB
SL AMB  POCT GLUCOSE, UA: NORMAL
SL AMB POCT URINE PROTEIN: NORMAL

## 2024-11-15 PROCEDURE — PNV: Performed by: OBSTETRICS & GYNECOLOGY

## 2024-11-15 PROCEDURE — 81002 URINALYSIS NONAUTO W/O SCOPE: CPT | Performed by: OBSTETRICS & GYNECOLOGY

## 2024-11-15 NOTE — PROGRESS NOTES
"Routine Prenatal Visit  [facility] - [address], [address], [place] [address]    Assessment/Plan:  Beatriz is a 24 y.o. year old  at 17w3d who presents for routine prenatal visit.     Assessment & Plan  17 weeks gestation of pregnancy    Orders:    POCT urine dip      Next OB Visit 4 weeks.    Subjective:     CC: Prenatal care    Beatriz Sánchez is a 24 y.o.  female who presents for routine prenatal care at 17w3d.  Pregnancy ROS: no leakage of fluid, pelvic pain, or vaginal bleeding.  normal fetal movement.    The following portions of the patient's history were reviewed and updated as appropriate: allergies, current medications, past family history, past medical history, obstetric history, gynecologic history, past social history, past surgical history and problem list.      Objective:  /72 (BP Location: Right arm, Patient Position: Sitting, Cuff Size: Standard)   Ht 5' 4\" (1.626 m)   Wt 66.2 kg (146 lb)   LMP  (Exact Date)   BMI 25.06 kg/m²   Pregravid Weight/BMI: 62.1 kg (137 lb) (BMI 23.50)  Current Weight: 66.2 kg (146 lb)   Total Weight Gain: 4.082 kg (9 lb)   Pre- Vitals      Flowsheet Row Most Recent Value   Prenatal Assessment    Fetal Heart Rate 150   Fundal Height (cm) 17 cm   Movement Absent   Prenatal Vitals    Blood Pressure 112/72   Weight - Scale 66.2 kg (146 lb)   Urine Albumin/Glucose    Dilation/Effacement/Station    Vaginal Drainage    Draining Fluid No   Edema              General: Well appearing, no distress  Abdomen: Soft, gravid, nontender  Extremities: Non tender.  "

## 2024-12-09 ENCOUNTER — ROUTINE PRENATAL (OUTPATIENT)
Dept: OBGYN CLINIC | Facility: CLINIC | Age: 24
End: 2024-12-09
Payer: COMMERCIAL

## 2024-12-09 VITALS
HEIGHT: 64 IN | BODY MASS INDEX: 26.12 KG/M2 | SYSTOLIC BLOOD PRESSURE: 122 MMHG | DIASTOLIC BLOOD PRESSURE: 72 MMHG | WEIGHT: 153 LBS

## 2024-12-09 DIAGNOSIS — Z34.02 ENCOUNTER FOR SUPERVISION OF NORMAL FIRST PREGNANCY IN SECOND TRIMESTER: Primary | ICD-10-CM

## 2024-12-09 DIAGNOSIS — Z3A.20 20 WEEKS GESTATION OF PREGNANCY: ICD-10-CM

## 2024-12-09 LAB
SL AMB  POCT GLUCOSE, UA: NORMAL
SL AMB POCT URINE PROTEIN: NORMAL

## 2024-12-09 PROCEDURE — 81002 URINALYSIS NONAUTO W/O SCOPE: CPT | Performed by: OBSTETRICS & GYNECOLOGY

## 2024-12-09 PROCEDURE — PNV: Performed by: OBSTETRICS & GYNECOLOGY

## 2024-12-09 RX ORDER — CHLORAL HYDRATE 500 MG
CAPSULE ORAL
COMMUNITY
Start: 2024-10-01

## 2024-12-09 NOTE — PROGRESS NOTES
"Routine Prenatal Visit  [facility] [address], [address], [place] [address]    Assessment/Plan:  Beatriz is a 24 y.o. year old  at 20w6d who presents for routine prenatal visit.     1. Encounter for supervision of normal first pregnancy in second trimester  2. 20 weeks gestation of pregnancy  -     POCT urine dip        Subjective:   Beatriz Sánchez is a 24 y.o.  who presents for routine prenatal care at 20w6d.  Complaints today: Denies, has anatomy scan next week  LOF: -; VB: -; Contractions: -; FM: +    Objective:  /72 (BP Location: Right arm, Patient Position: Sitting, Cuff Size: Standard)   Ht 5' 4\" (1.626 m)   Wt 69.4 kg (153 lb)   LMP  (Exact Date)   BMI 26.26 kg/m²     General: Well appearing, no distress  Respiratory: Unlabored breathing  Abdomen: Soft, gravid, nontender  Extremities: Warm and well perfused.  Non tender.    Pregravid Weight/BMI: 62.1 kg (137 lb) (BMI 23.50)  Current Weight: 69.4 kg (153 lb)   Total Weight Gain: 7.258 kg (16 lb)     Pre- Vitals      Flowsheet Row Most Recent Value   Prenatal Assessment    Fetal Heart Rate 154   Movement Present   Prenatal Vitals    Blood Pressure 122/72   Weight - Scale 69.4 kg (153 lb)   Urine Albumin/Glucose    Dilation/Effacement/Station    Vaginal Drainage    Edema              Fior Gagnon DO  2024 4:44 PM       "

## 2024-12-16 ENCOUNTER — ROUTINE PRENATAL (OUTPATIENT)
Dept: PERINATAL CARE | Facility: OTHER | Age: 24
End: 2024-12-16
Payer: COMMERCIAL

## 2024-12-16 VITALS
BODY MASS INDEX: 26.63 KG/M2 | WEIGHT: 156 LBS | HEART RATE: 67 BPM | SYSTOLIC BLOOD PRESSURE: 128 MMHG | DIASTOLIC BLOOD PRESSURE: 72 MMHG | HEIGHT: 64 IN

## 2024-12-16 DIAGNOSIS — Z36.3 ENCOUNTER FOR ANTENATAL SCREENING FOR MALFORMATIONS: ICD-10-CM

## 2024-12-16 DIAGNOSIS — Z36.86 ENCOUNTER FOR ANTENATAL SCREENING FOR CERVICAL LENGTH: ICD-10-CM

## 2024-12-16 DIAGNOSIS — Z3A.21 21 WEEKS GESTATION OF PREGNANCY: Primary | ICD-10-CM

## 2024-12-16 PROCEDURE — 76817 TRANSVAGINAL US OBSTETRIC: CPT | Performed by: OBSTETRICS & GYNECOLOGY

## 2024-12-16 PROCEDURE — 76805 OB US >/= 14 WKS SNGL FETUS: CPT | Performed by: OBSTETRICS & GYNECOLOGY

## 2024-12-16 PROCEDURE — 99213 OFFICE O/P EST LOW 20 MIN: CPT | Performed by: OBSTETRICS & GYNECOLOGY

## 2024-12-16 NOTE — PROGRESS NOTES
Ultrasound Probe Disinfection    A transvaginal ultrasound was performed.   Prior to use, disinfection was performed with High Level [employer]infection Process ([employer]  Probe serial number [id number] was used.    Carli Sharp  12/16/24  7:45 AM

## 2024-12-16 NOTE — LETTER
December 16, 2024     Fior Gagnon V,   [address] [address]    Patient: Beatriz Sánchez   YOB: 2000   Date of Visit: 12/16/2024       Dear Dr. Gagnon:    Thank you for referring Beatriz Sánchez to me for evaluation. Below are my notes for this consultation.    If you have questions, please do not hesitate to call me. I look forward to following your patient along with you.         Sincerely,        Ambrocio Delacruz MD        CC: No Recipients    Ambrocio Delacruz MD  12/16/2024  8:34 AM  Sign when Signing Visit  Please refer to the [facility] ultrasound report in Ob Procedures for additional information regarding today's visit

## 2024-12-16 NOTE — PROGRESS NOTES
Please refer to the [facility] ultrasound report in Ob Procedures for additional information regarding today's visit

## 2024-12-20 ENCOUNTER — TELEPHONE (OUTPATIENT)
Dept: OBGYN CLINIC | Facility: CLINIC | Age: 24
End: 2024-12-20

## 2024-12-20 NOTE — TELEPHONE ENCOUNTER
Second Trimester-    Overall how are you doing?   Has a cold right now, no fever, trying to rest and hydrate. Directed to safe OTC medication list for COLDS. Recommended plain Mucinex, Sudafed, Flonase, saline rinses as needed, warm mist shower,  f symptoms worsen follow-up with PCP.     Compliant with routine OB care appointments? yes    Have you completed your 1st trimester labs? yes    If you had NIPS with MFM, do you have a order for MSAFP? 11/11/24   Can be completed 15w-22w9d, ideally 16w-18w    Have you seen MFM and do you have your detailed US scheduled? 12/16/2024    Pregnancy Education-have you had a chance to review the classes offered and registered?   Would like to register for Child Birth preparation classes but all in person classes all full. Directed to call [phone], option 4 for further assistance.  Pt expressed she may consider doing on line or self paced on line courses.

## 2025-01-07 ENCOUNTER — ROUTINE PRENATAL (OUTPATIENT)
Dept: OBGYN CLINIC | Facility: CLINIC | Age: 25
End: 2025-01-07
Payer: COMMERCIAL

## 2025-01-07 ENCOUNTER — TELEPHONE (OUTPATIENT)
Dept: OBGYN CLINIC | Facility: CLINIC | Age: 25
End: 2025-01-07

## 2025-01-07 VITALS — SYSTOLIC BLOOD PRESSURE: 118 MMHG | BODY MASS INDEX: 27.12 KG/M2 | WEIGHT: 158 LBS | DIASTOLIC BLOOD PRESSURE: 70 MMHG

## 2025-01-07 DIAGNOSIS — Z3A.25 25 WEEKS GESTATION OF PREGNANCY: ICD-10-CM

## 2025-01-07 DIAGNOSIS — Z36.89 ENCOUNTER FOR OTHER SPECIFIED ANTENATAL SCREENING: Primary | ICD-10-CM

## 2025-01-07 LAB
SL AMB  POCT GLUCOSE, UA: NORMAL
SL AMB POCT URINE PROTEIN: NORMAL

## 2025-01-07 PROCEDURE — PNV: Performed by: OBSTETRICS & GYNECOLOGY

## 2025-01-07 PROCEDURE — 81002 URINALYSIS NONAUTO W/O SCOPE: CPT | Performed by: OBSTETRICS & GYNECOLOGY

## 2025-01-07 NOTE — TELEPHONE ENCOUNTER
Beatriz provided an [employer] breast pump order. Breast pump order faxed to [phone]. Fax confirmation & order scanned into pts records via [employer].

## 2025-01-07 NOTE — PROGRESS NOTES
Routine Prenatal Visit  [facility] - [address], [place] [address]    Assessment/Plan:  Beatriz is a 24 y.o. year old  at 25w0d who presents for routine prenatal visit.     1. Encounter for other specified  screening  -     Glucose, 1H PG; Future  -     CBC; Future  -     RPR, Rfx Qn RPR/Confirm TP; Future  -     Glucose, 1H PG  -     CBC  -     RPR, Rfx Qn RPR/Confirm TP  2. 25 weeks gestation of pregnancy  -     POCT urine dip    + fm  no UTCX   US Reviewed  AGA.   28 week labs and tdap  dw pt.   Diet and exercise reviewed .      Subjective:     CC: Prenatal care    Beatirz Sánchez is a 24 y.o.  female who presents for routine prenatal care at 25w0d.  Pregnancy ROS:  no leakage of fluid, pelvic pain, or vaginal bleeding.  +  fetal movement.    The following portions of the patient's history were reviewed and updated as appropriate: allergies, current medications, past family history, past medical history, obstetric history, gynecologic history, past social history, past surgical history and problem list.      Objective:  /70   Wt 71.7 kg (158 lb)   LMP  (Exact Date)   BMI 27.12 kg/m²   Pregravid Weight/BMI: 62.1 kg (137 lb) (BMI 23.50)  Current Weight: 71.7 kg (158 lb)   Total Weight Gain: 9.526 kg (21 lb)   Pre-Emerald Vitals    Flowsheet Row Most Recent Value   Prenatal Assessment    Fetal Heart Rate 154   Fundal Height (cm) 25 cm   Movement Present   Prenatal Vitals    Blood Pressure 118/70   Weight - Scale 71.7 kg (158 lb)   Urine Albumin/Glucose    Dilation/Effacement/Station    Vaginal Drainage    Draining Fluid No   Edema    LLE Edema None   RLE Edema None   Facial Edema None           General: Well appearing, no distress  Respiratory: Unlabored breathing  Cardiovascular: Regular rate.  Abdomen: Soft, gravid, nontender  Fundal Height: Appropriate for gestational age.  Extremities: Warm and well perfused.  Non tender.

## 2025-01-07 NOTE — PATIENT INSTRUCTIONS
NUTRITION IN PREGNANCY  Good Nutrition is a VERY important part of having a healthy pregnancy and healthy baby.  You should follow a healthy diet which include the following:   * Vegetables (which are dark green and leafy): at least 2 servings each day   * Protein (meat, eggs, beans, nuts, peanut butter): 3-4 servings each day   * Breads/whole grains (bread, pasta, rice, tortillas, potatoes): 3 servings each day   * Dairy (milk, yogurt, cheese): 3-4 servings each day   * Water: 6-8 glasses per day   * Calories: approximately 2000 to 2200 calories per day     WEIGHT GAIN   Recommended weight gain for you during your pregnancy is based on your body mass index (BMI) at the time that you became pregnant.   Pre-pregnant BMI Recommended weight gain   Underweight (BMI less than 18.5) 28 to 40 pounds   Normal weight (BMI 18.5-24.9) 25 to 35 pounds   Overweight (BMI 25-29.9) 15 to 25 pounds   Obese (BMI 30 or greater) 11 to 20 pounds     FOOD SAFETY   It is VERY important to eat only safely-prepared foods during pregnancy as you and your baby have a higher risk than usual for being affected by foodborne illnesses.  Follow these steps to ensure that you and your baby are safe from foodborne illnesses while you are pregnant:   wash hands thoroughly with warm water and soap before and after handling any foods   wash cutting boards, dishes, utensils, and countertops with hot water and soap before and after preparing any foods   rinse raw fruits and vegetables thoroughly under running water before eating   keep raw meat and seafood separate from other foods and use different cutting boards/utensils to handle raw meat than for other foods   put cooked food on a freshly clean plate   cook all of your foods thoroughly   discard foods that have been left out for more than 2 hours   refrigerate or freeze any foods than can spoil     There are three particular foodborne risks that you should be aware of and avoid as they can cause  serious harm to your unborn child.     * Listeria (a harmful bacteria)   don’t eat hot dogs or deli meats (unless they’re reheated until steaming hot)   don’t eat soft cheeses (such as Feta, Brie, Camembert) unless they are specifically labeled as being “made with pasteurized milk”   don’t drink raw (unpasteurized) milk   don’t eat refrigerated pates or meat spreads   don’t eat refrigerated smoked seafood unless it’s in a cooked dish like a casserole     * Mercury (a metal which is found in certain fish in high levels)   don’t eat shark, tilefish, landry mackerel, or swordfish   don’t eat more than 12 ounces per week of shrimp, salmon, pollock, or catfish   when eating tuna fish, you can have up to 6 ounces per week of canned albacore tuna OR up to 12 ounces of canned light tuna     * Toxoplasma (a harmful parasite)   cook meat thoroughly before eating   wear gloves when gardening or handling sand from a child’s sandbox   if you ha tve a cat, have someone else change the litter box while you are pregnant.    if you HAVE to clean it yourself, be sure to wash your hands thoroughly afterwards with warm water and soap.   don’t get a NEW cat while you are pregnant

## 2025-01-22 ENCOUNTER — NURSE TRIAGE (OUTPATIENT)
Age: 25
End: 2025-01-22

## 2025-01-22 ENCOUNTER — PATIENT MESSAGE (OUTPATIENT)
Dept: OBGYN CLINIC | Facility: CLINIC | Age: 25
End: 2025-01-22

## 2025-01-22 NOTE — TELEPHONE ENCOUNTER
Regardinw, paloma feels like baby is shivering  ----- Message from Jennifer ALMANZA sent at 2025 10:18 AM EST -----  Pt is 27 weeks and said for the past 24 hours she had 3 episodes where the baby felt like she was shivering or vibrating. It lasts 25 seconds. She said that even with fetal movement she feels as thought the baby is shivering.

## 2025-01-22 NOTE — TELEPHONE ENCOUNTER
"Pt is 27w1d, G1, with concerns for vibration/quivering fetal movement sensations. Initially started last night when laying on couch; Then, woke up twice overnight, 2 hours apart - lasted for about 25 seconds. Symptoms stopped on their own without pt needing to reposition or hydrate, etc. Pt notes normal fetal movement while experiencing vibration sensation. Describes sensation as  \"Feels like baby is vibrating/quivering/shaking\". Unsure if its baby or uterus. Feels entire stomach is doing this. This morning, feeling more subtle quiver sensations this morning intermittently. Pt denies pain, and describes sensation as \"weird vibrating feeling\". Denies bleeding, cramping, dizziness or other concerns/symptoms. Normal fetal movement this morning.     RN advised will discuss with provider and staff will return a call with update. Pt aware to call back if begins to experience pelvic pain or cramping, decreased fetal movement, vaginal bleeding. No further questions.     ESC sent to provider on call, Dr Fay. Per provider, agrees likely uterine or accessory muscle twitching especially if not painful, lasting several seconds and resolves on its own. Advised increase hydration and continue to monitor symptoms. Call back if experiencing vaginal bleeding, cramping/pelvic pain, loss of fluid or decreased fetal movement.     RN placed a call to patient with update. Pt verbalized an understanding. Aware to increase hydration and continue to monitor symptoms and baby's movements. Recommended call back with additional questions or if new symptoms arise or worsen. Pt agreeable to plan. No further questions.    Reason for Disposition  • Baby moving normally OR normal kick count    Answer Assessment - Initial Assessment Questions  1. FETAL MOVEMENT: \"Has the baby's movement decreased or changed significantly from normal?\" (e.g., yes, no; describe) \"When was the last time you felt the baby move?\" (e.g., minutes, hours)      Denies " "decreased movement or abnormal; but felt x3 last night a quivering or vibrating sensation in abdomen with fetal movement.   2. ALIDA: \"What date are you expecting to deliver?\"       4/22/25  3. PREGNANCY: \"How many weeks pregnant are you?\" \"How has the pregnancy been going?\"      27w1d  4. OTHER SYMPTOMS: \"Do you have any other symptoms?\" (e.g., abdomen pain, fever, leaking fluid from vagina, vaginal bleeding, widespread itching, etc.)      Denies    Protocols used: Pregnancy - Decreased or Abnormal Fetal Movement-Adult-[place]    "

## 2025-01-27 ENCOUNTER — ROUTINE PRENATAL (OUTPATIENT)
Dept: OBGYN CLINIC | Facility: CLINIC | Age: 25
End: 2025-01-27
Payer: COMMERCIAL

## 2025-01-27 VITALS
WEIGHT: 159 LBS | BODY MASS INDEX: 27.14 KG/M2 | SYSTOLIC BLOOD PRESSURE: 110 MMHG | HEIGHT: 64 IN | DIASTOLIC BLOOD PRESSURE: 64 MMHG

## 2025-01-27 DIAGNOSIS — Z3A.27 27 WEEKS GESTATION OF PREGNANCY: ICD-10-CM

## 2025-01-27 DIAGNOSIS — Z3A.28 28 WEEKS GESTATION OF PREGNANCY: Primary | ICD-10-CM

## 2025-01-27 LAB
SL AMB  POCT GLUCOSE, UA: NORMAL
SL AMB POCT URINE PROTEIN: NORMAL

## 2025-01-27 PROCEDURE — 81002 URINALYSIS NONAUTO W/O SCOPE: CPT | Performed by: OBSTETRICS & GYNECOLOGY

## 2025-01-27 PROCEDURE — PNV: Performed by: OBSTETRICS & GYNECOLOGY

## 2025-01-27 NOTE — PROGRESS NOTES
"Routine Prenatal Visit  [facility] - [address], [address], [place] [address]    Assessment/Plan:  Beatriz is a 24 y.o. year old  at 27w6d who presents for routine prenatal visit.     1. 28 weeks gestation of pregnancy  Comments:   with flu symptoms.   will call if needs  tamiflu .  counseling  to start wih in 48 hours of  onset of  symptoms. labs pending  2. 27 weeks gestation of pregnancy  Comments:  Declines  tdap today    will get at  next visit  .  DW pt  COVID  boster  Orders:  -     POCT urine dip        Subjective:     CC: Prenatal care    Beatriz Sánchez is a 24 y.o.  female who presents for routine prenatal care at 27w6d.  Pregnancy ROS: no  leakage of fluid, pelvic pain, or vaginal bleeding.  + fetal movement.    The following portions of the patient's history were reviewed and updated as appropriate: allergies, current medications, past family history, past medical history, obstetric history, gynecologic history, past social history, past surgical history and problem list.      Objective:  /64 (BP Location: Left arm, Patient Position: Sitting, Cuff Size: Standard)   Ht 5' 4\" (1.626 m)   Wt 72.1 kg (159 lb)   LMP  (Exact Date)   BMI 27.29 kg/m²   Pregravid Weight/BMI: 62.1 kg (137 lb) (BMI 23.50)  Current Weight: 72.1 kg (159 lb)   Total Weight Gain: 9.979 kg (22 lb)   Pre-Emerald Vitals    Flowsheet Row Most Recent Value   Prenatal Assessment    Fetal Heart Rate 146   Fundal Height (cm) 28 cm   Movement Present   Prenatal Vitals    Blood Pressure 110/64   Weight - Scale 72.1 kg (159 lb)   Urine Albumin/Glucose    Dilation/Effacement/Station    Vaginal Drainage    Draining Fluid No   Edema    LLE Edema None   RLE Edema None   Facial Edema None           General: Well appearing, no distress  Respiratory: Unlabored breathing  Cardiovascular: Regular rate.  Abdomen: Soft, gravid, nontender  Fundal Height: Appropriate for gestational age.  Extremities: Warm " and well perfused.  Non tender.

## 2025-01-31 LAB
EXTERNAL HEMATOCRIT: 36.8 %
EXTERNAL HEMOGLOBIN: 12 G/DL
EXTERNAL PLATELET COUNT: 171 K/ΜL
EXTERNAL SYPHILIS TOTAL IGG/IGM SCREENING: NORMAL

## 2025-02-01 LAB
ERYTHROCYTE [DISTWIDTH] IN BLOOD BY AUTOMATED COUNT: 12.2 % (ref 11.7–15.4)
GLUCOSE 1H P 50 G GLC PO SERPL-MCNC: 105 MG/DL (ref 70–139)
HCT VFR BLD AUTO: 36.8 % (ref 34–46.6)
HGB BLD-MCNC: 12 G/DL (ref 11.1–15.9)
MCH RBC QN AUTO: 31.9 PG (ref 26.6–33)
MCHC RBC AUTO-ENTMCNC: 32.6 G/DL (ref 31.5–35.7)
MCV RBC AUTO: 98 FL (ref 79–97)
PLATELET # BLD AUTO: 171 X10E3/UL (ref 150–450)
RBC # BLD AUTO: 3.76 X10E6/UL (ref 3.77–5.28)
RPR SER QL: NON REACTIVE
WBC # BLD AUTO: 10.8 X10E3/UL (ref 3.4–10.8)

## 2025-02-02 ENCOUNTER — RESULTS FOLLOW-UP (OUTPATIENT)
Dept: OBGYN CLINIC | Facility: CLINIC | Age: 25
End: 2025-02-02

## 2025-02-10 ENCOUNTER — ROUTINE PRENATAL (OUTPATIENT)
Dept: OBGYN CLINIC | Facility: CLINIC | Age: 25
End: 2025-02-10
Payer: COMMERCIAL

## 2025-02-10 VITALS
WEIGHT: 161 LBS | SYSTOLIC BLOOD PRESSURE: 106 MMHG | BODY MASS INDEX: 27.49 KG/M2 | DIASTOLIC BLOOD PRESSURE: 66 MMHG | HEIGHT: 64 IN

## 2025-02-10 DIAGNOSIS — Z34.03 ENCOUNTER FOR SUPERVISION OF NORMAL FIRST PREGNANCY IN THIRD TRIMESTER: Primary | ICD-10-CM

## 2025-02-10 DIAGNOSIS — Z3A.29 29 WEEKS GESTATION OF PREGNANCY: ICD-10-CM

## 2025-02-10 DIAGNOSIS — Z23 NEED FOR TDAP VACCINATION: ICD-10-CM

## 2025-02-10 LAB
SL AMB  POCT GLUCOSE, UA: NORMAL
SL AMB POCT URINE PROTEIN: NORMAL

## 2025-02-10 PROCEDURE — 90715 TDAP VACCINE 7 YRS/> IM: CPT | Performed by: OBSTETRICS & GYNECOLOGY

## 2025-02-10 PROCEDURE — 90471 IMMUNIZATION ADMIN: CPT | Performed by: OBSTETRICS & GYNECOLOGY

## 2025-02-10 PROCEDURE — PNV: Performed by: OBSTETRICS & GYNECOLOGY

## 2025-02-10 PROCEDURE — 81002 URINALYSIS NONAUTO W/O SCOPE: CPT | Performed by: OBSTETRICS & GYNECOLOGY

## 2025-02-10 NOTE — PROGRESS NOTES
"Routine Prenatal Visit  [facility] [address], [address], [place] [address]    Assessment/Plan:  Beatriz is a 24 y.o. year old  at 29w6d who presents for routine prenatal visit.     1. Encounter for supervision of normal first pregnancy in third trimester  2. 29 weeks gestation of pregnancy  -     POCT urine dip  3. Need for Tdap vaccination  -     TDAP VACCINE GREATER THAN OR EQUAL TO 6YO IM        Subjective:   Beatriz Sánchez is a 24 y.o.  who presents for routine prenatal care at 29w6d.  Complaints today: Denies  LOF: -; VB: -; Contractions: -; FM: +    Objective:  /66 (BP Location: Left arm, Patient Position: Sitting, Cuff Size: Standard)   Ht 5' 4\" (1.626 m)   Wt 73 kg (161 lb)   LMP  (Exact Date)   BMI 27.64 kg/m²     General: Well appearing, no distress  Respiratory: Unlabored breathing  Abdomen: Soft, gravid, nontender  Extremities: Warm and well perfused.  Non tender.    Pregravid Weight/BMI: 62.1 kg (137 lb) (BMI 23.50)  Current Weight: 73 kg (161 lb)   Total Weight Gain: 10.9 kg (24 lb)     Pre- Vitals      Flowsheet Row Most Recent Value   Prenatal Assessment    Fetal Heart Rate 140   Fundal Height (cm) 30 cm   Movement Present   Prenatal Vitals    Blood Pressure 106/66   Weight - Scale 73 kg (161 lb)   Urine Albumin/Glucose    Dilation/Effacement/Station    Vaginal Drainage    Edema              Fior Gagnon DO  2/10/2025 10:50 AM     "

## 2025-02-24 ENCOUNTER — ROUTINE PRENATAL (OUTPATIENT)
Dept: OBGYN CLINIC | Facility: CLINIC | Age: 25
End: 2025-02-24
Payer: COMMERCIAL

## 2025-02-24 VITALS
DIASTOLIC BLOOD PRESSURE: 74 MMHG | HEIGHT: 64 IN | BODY MASS INDEX: 28.51 KG/M2 | SYSTOLIC BLOOD PRESSURE: 124 MMHG | WEIGHT: 167 LBS

## 2025-02-24 DIAGNOSIS — Z34.03 ENCOUNTER FOR SUPERVISION OF NORMAL FIRST PREGNANCY IN THIRD TRIMESTER: ICD-10-CM

## 2025-02-24 DIAGNOSIS — Z3A.31 31 WEEKS GESTATION OF PREGNANCY: Primary | ICD-10-CM

## 2025-02-24 LAB
SL AMB  POCT GLUCOSE, UA: NORMAL
SL AMB POCT URINE PROTEIN: NORMAL

## 2025-02-24 PROCEDURE — PNV: Performed by: OBSTETRICS & GYNECOLOGY

## 2025-02-24 PROCEDURE — 81002 URINALYSIS NONAUTO W/O SCOPE: CPT | Performed by: OBSTETRICS & GYNECOLOGY

## 2025-02-24 NOTE — PROGRESS NOTES
"Routine Prenatal Visit  [facility] - [address], [address], [place] [address]    Assessment/Plan:  Beatriz is a 24 y.o. year old  at 31w6d who presents for routine prenatal visit.     Assessment & Plan  31 weeks gestation of pregnancy    Orders:    POCT urine dip    Encounter for supervision of normal first pregnancy in third trimester           Next OB Visit 2 weeks.    Subjective:     CC: Prenatal care    Beatriz Sánchez is a 24 y.o.  female who presents for routine prenatal care at 31w6d.  Pregnancy ROS: no leakage of fluid, pelvic pain, or vaginal bleeding.  normal fetal movement.    The following portions of the patient's history were reviewed and updated as appropriate: allergies, current medications, past family history, past medical history, obstetric history, gynecologic history, past social history, past surgical history and problem list.      Objective:  /74 (BP Location: Right arm, Patient Position: Sitting, Cuff Size: Standard)   Ht 5' 4\" (1.626 m)   Wt 75.8 kg (167 lb)   LMP  (Exact Date)   BMI 28.67 kg/m²   Pregravid Weight/BMI: 62.1 kg (137 lb) (BMI 23.50)  Current Weight: 75.8 kg (167 lb)   Total Weight Gain: 13.6 kg (30 lb)   Pre- Vitals      Flowsheet Row Most Recent Value   Prenatal Assessment    Fetal Heart Rate 150   Fundal Height (cm) 32 cm   Movement Present   Prenatal Vitals    Blood Pressure 124/74   Weight - Scale 75.8 kg (167 lb)   Urine Albumin/Glucose    Dilation/Effacement/Station    Vaginal Drainage    Draining Fluid No   Edema              General: Well appearing, no distress  Abdomen: Soft, gravid, nontender  Extremities: Non tender.  "

## 2025-03-03 ENCOUNTER — ULTRASOUND (OUTPATIENT)
Dept: PERINATAL CARE | Facility: OTHER | Age: 25
End: 2025-03-03
Payer: COMMERCIAL

## 2025-03-03 VITALS
DIASTOLIC BLOOD PRESSURE: 68 MMHG | HEIGHT: 64 IN | HEART RATE: 61 BPM | BODY MASS INDEX: 28.82 KG/M2 | WEIGHT: 168.8 LBS | SYSTOLIC BLOOD PRESSURE: 124 MMHG

## 2025-03-03 DIAGNOSIS — Z3A.32 32 WEEKS GESTATION OF PREGNANCY: Primary | ICD-10-CM

## 2025-03-03 DIAGNOSIS — O35.2XX0 HEREDITARY DISEASE IN FAMILY POSSIBLY AFFECTING FETUS, AFFECTING MANAGEMENT OF MOTHER IN PREGNANCY, SINGLE OR UNSPECIFIED FETUS: ICD-10-CM

## 2025-03-03 PROCEDURE — 99212 OFFICE O/P EST SF 10 MIN: CPT | Performed by: OBSTETRICS & GYNECOLOGY

## 2025-03-03 PROCEDURE — 76816 OB US FOLLOW-UP PER FETUS: CPT | Performed by: OBSTETRICS & GYNECOLOGY

## 2025-03-03 NOTE — PROGRESS NOTES
The patient was seen today for an ultrasound.  Please see ultrasound report (located under Ob Procedures) for additional details.   Thank you very much for allowing us to participate in the care of this very nice patient.  Should you have any questions, please do not hesitate to contact me.     Ventura Michael MD FACOG  Attending Physician, Maternal-Fetal Medicine  [facility]

## 2025-03-05 ENCOUNTER — TELEPHONE (OUTPATIENT)
Dept: OBGYN CLINIC | Facility: CLINIC | Age: 25
End: 2025-03-05

## 2025-03-05 NOTE — TELEPHONE ENCOUNTER
"Third Trimester call -    Overall how are you feeling? Pt reports she is \"feeling good and getting ready.\"    Has everything packed and is ready to go.   Compliant with routine OB appointments? yes    Have you completed your 3rd trimester lab work? yes    Have you reviewed the contents of 3rd trimester folder from office?    Have you decided on a pediatrician? yes    If yes, who [facility]     If no, reviewed practices and transferred call to [phone] to set up appointment with pediatric office.   Questions on paperwork to go back to office? No, returned paper work to office    Questions on the baby birth certificate and photography forms? No       "

## 2025-03-10 ENCOUNTER — ROUTINE PRENATAL (OUTPATIENT)
Dept: OBGYN CLINIC | Facility: CLINIC | Age: 25
End: 2025-03-10
Payer: COMMERCIAL

## 2025-03-10 VITALS
SYSTOLIC BLOOD PRESSURE: 122 MMHG | DIASTOLIC BLOOD PRESSURE: 68 MMHG | WEIGHT: 168 LBS | BODY MASS INDEX: 28.68 KG/M2 | HEIGHT: 64 IN

## 2025-03-10 DIAGNOSIS — B00.9 HSV-1 INFECTION: Primary | ICD-10-CM

## 2025-03-10 DIAGNOSIS — Z34.03 ENCOUNTER FOR SUPERVISION OF NORMAL FIRST PREGNANCY IN THIRD TRIMESTER: ICD-10-CM

## 2025-03-10 DIAGNOSIS — Z3A.33 33 WEEKS GESTATION OF PREGNANCY: ICD-10-CM

## 2025-03-10 PROBLEM — O26.891 VAGINAL DISCHARGE DURING PREGNANCY IN FIRST TRIMESTER: Status: RESOLVED | Noted: 2024-09-13 | Resolved: 2025-03-10

## 2025-03-10 PROBLEM — N89.8 VAGINAL DISCHARGE DURING PREGNANCY IN FIRST TRIMESTER: Status: RESOLVED | Noted: 2024-09-13 | Resolved: 2025-03-10

## 2025-03-10 LAB
SL AMB  POCT GLUCOSE, UA: NORMAL
SL AMB POCT URINE PROTEIN: NORMAL

## 2025-03-10 PROCEDURE — PNV: Performed by: STUDENT IN AN ORGANIZED HEALTH CARE EDUCATION/TRAINING PROGRAM

## 2025-03-10 PROCEDURE — 81002 URINALYSIS NONAUTO W/O SCOPE: CPT | Performed by: STUDENT IN AN ORGANIZED HEALTH CARE EDUCATION/TRAINING PROGRAM

## 2025-03-10 NOTE — ASSESSMENT & PLAN NOTE
- Labs up to date   - Genetic testing: low risk cell free DNA, negative AFP screen   - US 3/3  AC             27.87 cm        31 weeks 6 days* (24%)  BPD             7.94 cm        31 weeks 6 days* (17%)  HC             29.94 cm        33 weeks 1 day * (21%)  Femur           6.31 cm        32 weeks 4 days* (32%)     HC/AC           1.07 [0.96 - 1.17]                 (65%)  FL/AC             23 [20 - 24]  FL/BPD            79 [71 - 87]  EFW [place] 4   1936 grams - 4 lbs 4 oz  (23%)  - Vaccinations: S/p Tdap   - RTC for 36 week Ob visit     Orders:    POCT urine dip     Medical condition is currently stable, continue current treatment plan.

## 2025-03-10 NOTE — PROGRESS NOTES
"Routine Prenatal Visit  [facility] [place]  [address], [address], [place] [address]  Assessment & Plan  33 weeks gestation of pregnancy  - Labs up to date   - Genetic testing: low risk cell free DNA, negative AFP screen   - US 3/3  AC             27.87 cm        31 weeks 6 days* (24%)  BPD             7.94 cm        31 weeks 6 days* (17%)  HC             29.94 cm        33 weeks 1 day * (21%)  Femur           6.31 cm        32 weeks 4 days* (32%)     HC/AC           1.07 [0.96 - 1.17]                 (65%)  FL/AC             23 [20 - 24]  FL/BPD            79 [71 - 87]  EFW [place] 4   1936 grams - 4 lbs 4 oz  (23%)  - Vaccinations: S/p Tdap   - RTC for 36 week Ob visit     Orders:    POCT urine dip    Encounter for supervision of normal first pregnancy in third trimester         HSV-1 infection  - Cold sores, denies genital lesions   - Denies current outbreak, last outbreak was oral and was treated with Valtrex        Subjective:   Beatriz Sánchez is a 24 y.o.  who presents for routine prenatal care at 33w6d.  Complaints today: none    LOF: no; VB: no; Contractions: no; FM: yes     Objective:  /68 (BP Location: Right arm, Patient Position: Sitting, Cuff Size: Standard)   Ht 5' 4\" (1.626 m)   Wt 76.2 kg (168 lb)   LMP  (Exact Date)   BMI 28.84 kg/m²     General: Well appearing, no distress  Respiratory: Unlabored breathing  Cardiovascular: Regular rate.  Abdomen: Soft, gravid, nontender  Extremities: Warm and well perfused.  Non tender.    Pregravid Weight/BMI: 62.1 kg (137 lb) (BMI 23.50)  Current Weight: 76.2 kg (168 lb)   Total Weight Gain: 14.1 kg (31 lb)   Pre- Vitals      Flowsheet Row Most Recent Value   Prenatal Assessment    Fetal Heart Rate 144   Fundal Height (cm) 33 cm   Prenatal Vitals    Blood Pressure 122/68   Weight - Scale 76.2 kg (168 lb)   Urine Albumin/Glucose    Dilation/Effacement/Station    Vaginal Drainage    Edema           Jaylen Aslam, " MD  3/10/2025 12:01 PM

## 2025-03-10 NOTE — ASSESSMENT & PLAN NOTE
- Cold sores, denies genital lesions   - Denies current outbreak, last outbreak was oral and was treated with Valtrex

## 2025-03-21 ENCOUNTER — NURSE TRIAGE (OUTPATIENT)
Age: 25
End: 2025-03-21

## 2025-03-21 NOTE — TELEPHONE ENCOUNTER
"FOLLOW UP: ESC message to Dr. Gagnon. Per Dr. Gagnon, she will call the patient.     REASON FOR CONVERSATION: Pregnancy Problem    SYMPTOMS: Abdominal cramping, dull lower back pain, urinary urgency    OTHER: The patient is 35 weeks 3 days pregnant complaining of lower abdominal cramping that radiates to her lower back. She describes the back pain as dull. The pain started yesterday at 1pm. She also has urinary urgency. She rates the pain a 1-2, it gets worse with movement and being on her feet, she feels better when lying down. She says the pain is constant but feels like period cramps. She denies decreased fetal movement, vaginal bleeding, leaking fluid, burning with urination, urine odor, frequency or any other symptoms to note at this time.      DISPOSITION: Discuss with Provider and Call Back Patient (overriding Go to LD Now (Or to Office With PCP Approval))            Answer Assessment - Initial Assessment Questions  1. LOCATION: \"Where does it hurt?\"       Lower abdominal cramping   2. RADIATION: \"Does the pain shoot anywhere else?\" (e.g., chest, back)      Lower back ( dull back pain)    3. ONSET: \"When did the pain begin?\" (Minutes, hours or days ago)       Yesterday 1pm.   4. SUDDEN: \"Gradual or sudden onset?\"      Gradual   5. PATTERN: \"Does the pain come and go, or has it been constant since it started?\"       Constant   6. SEVERITY: \"How bad is the pain?\" \"What does it keep you from doing?\"  (e.g., Scale 1-10; mild, moderate, or severe)      1-2   7. RECURRENT SYMPTOM: \"Have you ever had this type of stomach pain before?\" If Yes, ask: \"When was the last time?\" and \"What happened that time?\"       No   8. CAUSE: \"What do you think is causing the stomach pain?      Unknown   9. RELIEVING/AGGRAVATING FACTORS: \"What makes it better or worse?\" (e.g., antacids, bowel movement, movement)      Being on her feet makes it worse, laying down helps it.   10. FETAL MOVEMENT: \"Has the baby's movement decreased " "or changed significantly from normal?\"        No   11. OTHER SYMPTOMS: \"Do you have any other symptoms?\" (e.g., back pain, contractions, diarrhea, fever, headache, urination pain, vaginal bleeding, vaginal discharge, vomiting)        Urgency   12. ALIDA: \"What date are you expecting to deliver?\"        4/22/25 - 35 weeks 3 days    Protocols used: Pregnancy - Abdominal Pain Greater Than 20 Weeks EGA-Adult-[place]    "

## 2025-03-21 NOTE — TELEPHONE ENCOUNTER
Reason for Disposition  • MILD constant abdominal pain (e.g., doesn't interfere with normal activities) or tightening, and present > 2 hours    Protocols used: Pregnancy - Abdominal Pain Greater Than 20 Weeks EGA-Adult-[place]

## 2025-03-23 ENCOUNTER — NURSE TRIAGE (OUTPATIENT)
Dept: OTHER | Facility: OTHER | Age: 25
End: 2025-03-23

## 2025-03-23 ENCOUNTER — HOSPITAL ENCOUNTER (INPATIENT)
Facility: HOSPITAL | Age: 25
LOS: 3 days | Discharge: HOME | End: 2025-03-27
Attending: OBSTETRICS & GYNECOLOGY | Admitting: OBSTETRICS & GYNECOLOGY
Payer: COMMERCIAL

## 2025-03-23 DIAGNOSIS — O13.3 GESTATIONAL HYPERTENSION, THIRD TRIMESTER: ICD-10-CM

## 2025-03-23 DIAGNOSIS — Z3A.35 35 WEEKS GESTATION OF PREGNANCY: Primary | ICD-10-CM

## 2025-03-23 PROBLEM — R51.9 HEADACHE IN PREGNANCY, ANTEPARTUM, THIRD TRIMESTER: Status: ACTIVE | Noted: 2025-03-23

## 2025-03-23 PROBLEM — O26.893 HEADACHE IN PREGNANCY, ANTEPARTUM, THIRD TRIMESTER: Status: ACTIVE | Noted: 2025-03-23

## 2025-03-23 PROBLEM — R51.9 HEADACHE IN PREGNANCY, ANTEPARTUM, THIRD TRIMESTER: Status: RESOLVED | Noted: 2025-03-23 | Resolved: 2025-03-23

## 2025-03-23 PROBLEM — O26.893 HEADACHE IN PREGNANCY, ANTEPARTUM, THIRD TRIMESTER: Status: RESOLVED | Noted: 2025-03-23 | Resolved: 2025-03-23

## 2025-03-23 LAB
ABO GROUP BLD: NORMAL
ALBUMIN SERPL BCG-MCNC: 3.5 G/DL (ref 3.5–5)
ALP SERPL-CCNC: 154 U/L (ref 34–104)
ALT SERPL W P-5'-P-CCNC: 13 U/L (ref 7–52)
ANION GAP SERPL CALCULATED.3IONS-SCNC: 8 MMOL/L (ref 4–13)
AST SERPL W P-5'-P-CCNC: 18 U/L (ref 13–39)
BACTERIA UR QL AUTO: ABNORMAL /HPF
BILIRUB SERPL-MCNC: 0.3 MG/DL (ref 0.2–1)
BILIRUB UR QL STRIP: NEGATIVE
BLD GP AB SCN SERPL QL: NEGATIVE
BUN SERPL-MCNC: 8 MG/DL (ref 5–25)
CALCIUM SERPL-MCNC: 9.2 MG/DL (ref 8.4–10.2)
CHLORIDE SERPL-SCNC: 105 MMOL/L (ref 96–108)
CLARITY UR: CLEAR
CO2 SERPL-SCNC: 24 MMOL/L (ref 21–32)
COLOR UR: ABNORMAL
CREAT SERPL-MCNC: 0.7 MG/DL (ref 0.6–1.3)
CREAT UR-MCNC: 33.8 MG/DL
ERYTHROCYTE [DISTWIDTH] IN BLOOD BY AUTOMATED COUNT: 12.2 % (ref 11.6–15.1)
GFR SERPL CREATININE-BSD FRML MDRD: 119 ML/MIN/1.73SQ M
GLUCOSE SERPL-MCNC: 73 MG/DL (ref 65–140)
GLUCOSE UR STRIP-MCNC: NEGATIVE MG/DL
HCT VFR BLD AUTO: 36.3 % (ref 34.8–46.1)
HGB BLD-MCNC: 12.4 G/DL (ref 11.5–15.4)
HGB UR QL STRIP.AUTO: NEGATIVE
KETONES UR STRIP-MCNC: NEGATIVE MG/DL
LEUKOCYTE ESTERASE UR QL STRIP: ABNORMAL
MCH RBC QN AUTO: 31.9 PG (ref 26.8–34.3)
MCHC RBC AUTO-ENTMCNC: 34.2 G/DL (ref 31.4–37.4)
MCV RBC AUTO: 93 FL (ref 82–98)
NITRITE UR QL STRIP: NEGATIVE
NON-SQ EPI CELLS URNS QL MICRO: ABNORMAL /HPF
PH UR STRIP.AUTO: 7.5 [PH]
PLATELET # BLD AUTO: 154 THOUSANDS/UL (ref 149–390)
PMV BLD AUTO: 11.6 FL (ref 8.9–12.7)
POTASSIUM SERPL-SCNC: 3.4 MMOL/L (ref 3.5–5.3)
PROT SERPL-MCNC: 6.5 G/DL (ref 6.4–8.4)
PROT UR STRIP-MCNC: NEGATIVE MG/DL
PROT UR-MCNC: 6.6 MG/DL
PROT/CREAT UR: 0.2 MG/G{CREAT} (ref 0–0.1)
RBC # BLD AUTO: 3.89 MILLION/UL (ref 3.81–5.12)
RBC #/AREA URNS AUTO: ABNORMAL /HPF
RH BLD: POSITIVE
SODIUM SERPL-SCNC: 137 MMOL/L (ref 135–147)
SP GR UR STRIP.AUTO: 1.01 (ref 1–1.03)
SPECIMEN EXPIRATION DATE: NORMAL
UROBILINOGEN UR STRIP-ACNC: <2 MG/DL
WBC # BLD AUTO: 8.18 THOUSAND/UL (ref 4.31–10.16)
WBC #/AREA URNS AUTO: ABNORMAL /HPF

## 2025-03-23 PROCEDURE — 86780 TREPONEMA PALLIDUM: CPT | Performed by: OBSTETRICS & GYNECOLOGY

## 2025-03-23 PROCEDURE — G0379 DIRECT REFER HOSPITAL OBSERV: HCPCS

## 2025-03-23 PROCEDURE — 86850 RBC ANTIBODY SCREEN: CPT | Performed by: OBSTETRICS & GYNECOLOGY

## 2025-03-23 PROCEDURE — 80053 COMPREHEN METABOLIC PANEL: CPT | Performed by: OBSTETRICS & GYNECOLOGY

## 2025-03-23 PROCEDURE — 99213 OFFICE O/P EST LOW 20 MIN: CPT

## 2025-03-23 PROCEDURE — 81001 URINALYSIS AUTO W/SCOPE: CPT | Performed by: OBSTETRICS & GYNECOLOGY

## 2025-03-23 PROCEDURE — NC001 PR NO CHARGE: Performed by: OBSTETRICS & GYNECOLOGY

## 2025-03-23 PROCEDURE — 86901 BLOOD TYPING SEROLOGIC RH(D): CPT | Performed by: OBSTETRICS & GYNECOLOGY

## 2025-03-23 PROCEDURE — 82570 ASSAY OF URINE CREATININE: CPT | Performed by: OBSTETRICS & GYNECOLOGY

## 2025-03-23 PROCEDURE — 85027 COMPLETE CBC AUTOMATED: CPT | Performed by: OBSTETRICS & GYNECOLOGY

## 2025-03-23 PROCEDURE — 87150 DNA/RNA AMPLIFIED PROBE: CPT | Performed by: OBSTETRICS & GYNECOLOGY

## 2025-03-23 PROCEDURE — 86900 BLOOD TYPING SEROLOGIC ABO: CPT | Performed by: OBSTETRICS & GYNECOLOGY

## 2025-03-23 PROCEDURE — 84156 ASSAY OF PROTEIN URINE: CPT | Performed by: OBSTETRICS & GYNECOLOGY

## 2025-03-23 RX ORDER — METOCLOPRAMIDE 10 MG/1
10 TABLET ORAL ONCE
Status: COMPLETED | OUTPATIENT
Start: 2025-03-23 | End: 2025-03-23

## 2025-03-23 RX ORDER — DIPHENHYDRAMINE HCL 25 MG
25 TABLET ORAL EVERY 6 HOURS PRN
Status: DISCONTINUED | OUTPATIENT
Start: 2025-03-23 | End: 2025-03-27 | Stop reason: HOSPADM

## 2025-03-23 RX ORDER — HYDROXYZINE HYDROCHLORIDE 25 MG/1
25 TABLET, FILM COATED ORAL
Status: DISCONTINUED | OUTPATIENT
Start: 2025-03-23 | End: 2025-03-27 | Stop reason: HOSPADM

## 2025-03-23 RX ORDER — DIPHENHYDRAMINE HCL 25 MG
25 TABLET ORAL ONCE
Status: COMPLETED | OUTPATIENT
Start: 2025-03-23 | End: 2025-03-23

## 2025-03-23 RX ORDER — BETAMETHASONE SODIUM PHOSPHATE AND BETAMETHASONE ACETATE 3; 3 MG/ML; MG/ML
12 INJECTION, SUSPENSION INTRA-ARTICULAR; INTRALESIONAL; INTRAMUSCULAR; SOFT TISSUE EVERY 24 HOURS
Status: COMPLETED | OUTPATIENT
Start: 2025-03-23 | End: 2025-03-24

## 2025-03-23 RX ORDER — VALACYCLOVIR HYDROCHLORIDE 500 MG/1
500 TABLET, FILM COATED ORAL 2 TIMES DAILY PRN
Status: DISCONTINUED | OUTPATIENT
Start: 2025-03-23 | End: 2025-03-25

## 2025-03-23 RX ORDER — METOCLOPRAMIDE 10 MG/1
10 TABLET ORAL EVERY 6 HOURS PRN
Status: DISCONTINUED | OUTPATIENT
Start: 2025-03-23 | End: 2025-03-25

## 2025-03-23 RX ORDER — ASPIRIN 81 MG/1
162 TABLET, CHEWABLE ORAL DAILY
Status: DISCONTINUED | OUTPATIENT
Start: 2025-03-24 | End: 2025-03-25

## 2025-03-23 RX ORDER — ACETAMINOPHEN 325 MG/1
975 TABLET ORAL EVERY 6 HOURS PRN
Status: DISCONTINUED | OUTPATIENT
Start: 2025-03-23 | End: 2025-03-25

## 2025-03-23 RX ORDER — ACETAMINOPHEN 325 MG/1
975 TABLET ORAL ONCE
Status: COMPLETED | OUTPATIENT
Start: 2025-03-23 | End: 2025-03-23

## 2025-03-23 RX ORDER — ONDANSETRON 2 MG/ML
4 INJECTION INTRAMUSCULAR; INTRAVENOUS EVERY 8 HOURS PRN
Status: DISCONTINUED | OUTPATIENT
Start: 2025-03-23 | End: 2025-03-25

## 2025-03-23 RX ADMIN — BETAMETHASONE SODIUM PHOSPHATE AND BETAMETHASONE ACETATE 12 MG: 3; 3 INJECTION, SUSPENSION INTRA-ARTICULAR; INTRALESIONAL; INTRAMUSCULAR at 18:23

## 2025-03-23 RX ADMIN — ACETAMINOPHEN 975 MG: 325 TABLET, FILM COATED ORAL at 17:04

## 2025-03-23 RX ADMIN — DIPHENHYDRAMINE HYDROCHLORIDE 25 MG: 25 TABLET ORAL at 17:04

## 2025-03-23 RX ADMIN — METOCLOPRAMIDE 10 MG: 10 TABLET ORAL at 17:04

## 2025-03-23 NOTE — H&P
"Ob/Gyn History and Physical  Beatriz Sánchez 24 y.o. female MRN: [id number]  Unit/Bed#:  210- Encounter: [id number]    A/P. 24 y.o.  at 35w5d, here with GHTN.  Assessment & Plan  Gestational hypertension, third trimester  New diagnosis.  Presented with headache.  BP elevated in \"mild range.\"  Laboratory studies without evidence of end-organ damage, and P/C ratio < 0.3.      Has not met criteria yet as not 2 BP > 4 hours apart.  Headache abated with Tylenol/Reglan/Benadryl.  Long discussion with her and her  regarding GHTN/Preeclampsia, diagnosis, treatment, and delivery timing.  All qs answered.  Given that this is a new diagnosis, and presentation was with symptoms, will keep overnight to observe.  --> Admit for obs.  --> Follow BP/Sx.  --> Repeat labs in AM.   35 weeks gestation of pregnancy  Prematurity.  35.5 weeks gestational age.  I suspect that she may deliver in the next 7 days.  We discussed late  corticosteroids and the benefits and risks of these, and she elects to receive these to enhance FLM and reduce the risk of transient respiratory complications of the .  --> Betamethasone to enhance FLM.  --> NICU consult.    GBS unknown/.  --> GBS sent.  --> Will need prophylaxis unless this returns negative.    Fetal status reassuring.  Reactive NST, normal RAJAN.  --> NST bid while in-house.    Corky Stuart MD  23  -------------------------------------------------------------------------------------------------------  CC/    \"I have a headache.\"    HPI/    For two days with relatively mild though persistent headache, frontal.  No assoc neuro symptoms, no VF changes.    Rare UC, no VB/LoF. (+)FM    Pregnancy complications/      Patient Active Problem List   Diagnosis    Infertility counseling    Hereditary disease in family possibly affecting fetus    35 weeks gestation of pregnancy    Encounter for supervision of normal first pregnancy in third trimester    " HSV-1 infection    Gestational hypertension, third trimester       Allergies/      Allergies as of 2025    (No Known Allergies)       Rx/      No current facility-administered medications on file prior to encounter.     Current Outpatient Medications on File Prior to Encounter   Medication Sig Dispense Refill    aspirin 81 mg chewable tablet Chew 2 tablets (162 mg total) daily      CHOLINE PO Take 500 mg by mouth      Omega-3 1000 MG CAPS       Prenatal Vit-Fe Fumarate-FA (Prenatal 19) 29-1 MG CHEW       valACYclovir (VALTREX) 1,000 mg tablet 2 (two) times a day as needed Taking as needed         PMH/      Past Medical History:   Diagnosis Date    History of cold sores     Treated with valtrex as needed.    Migraine     without Aura    Vitiligo        PSH/      Past Surgical History:   Procedure Laterality Date    WISDOM TOOTH EXTRACTION         ObHx/    , expecting a baby girl named Kya      OB History    Para Term  AB Living   1 0 0 0 0 0   SAB IAB Ectopic Multiple Live Births   0 0 0 0 0      # Outcome Date GA Lbr Reji/2nd Weight Sex Type Anes PTL Lv   1 Current               Obstetric Comments   Menarche  at  10   ,  28-30    wearing tampons   chg  every   8 hours  ,  not up at night   to chg  cramps    not terrible  mild to mod   Started   birth control at  age    14 for contraception    hx of   HMB as a teen  and cramps         GynHx/    There is no history of sexually-transmitted infections.    She has orolabial HSV.  She has never had genital disease ever.    FH/         Family History   Problem Relation Age of Onset    Thyroid disease Mother     Heart disease Father     Thyroid disease Sister     Brain cancer Sister     Vitiligo Brother     Multiple sclerosis Maternal Grandfather     Heart attack Paternal Grandfather     Heart disease Paternal Grandfather     Breast cancer Neg Hx     Uterine cancer Neg Hx     Ovarian cancer Neg Hx     Colon cancer Neg Hx        SH/    She is   to [place].    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    She does not use tobacco, alcohol, or illicit drugs.        Social History     Socioeconomic History    Marital status: /Civil Union     Spouse name: Not on file    Number of children: Not on file    Years of education: Not on file    Highest education level: Not on file   Occupational History    Not on file   Tobacco Use    Smoking status: Never     Passive exposure: Never    Smokeless tobacco: Never   Vaping Use    Vaping status: Never Used   Substance and Sexual Activity    Alcohol use: Not Currently    Drug use: Never    Sexual activity: Yes     Partners: Male     Birth control/protection: None   Other Topics Concern    Not on file   Social History Narrative    Not on file     Social Drivers of Health     Financial Resource Strain: Not on file   Food Insecurity: No Food Insecurity (10/4/2024)    Nursing - Inadequate Food Risk Classification     Worried About Running Out of Food in the Last Year: Never true     Ran Out of Food in the Last Year: Never true     Ran Out of Food in the Last Year: Not on file   Transportation Needs: No Transportation Needs (10/4/2024)    PRAPARE - Transportation     Lack of Transportation (Medical): No     Lack of Transportation (Non-Medical): No   Physical Activity: Not on file   Stress: Not on file   Social Connections: Not on file   Intimate Partner Violence: Not on file   Housing Stability: Low Risk  (10/4/2024)    Housing Stability Vital Sign     Unable to Pay for Housing in the Last Year: No     Number of Times Moved in the Last Year: 1     Homeless in the Last Year: No       RoS/    Constitutional: Negative    CV: Negative    Pulm: Negative    GI: Negative    Urinary: Negative    Neuro: Negative    Musculoskeletal: Negative    O/  /90 (BP Location: Right arm)   Pulse 68   Resp 18   LMP  (Exact Date)     Alert, comfortable, no acute distress      Neuro:        Alert, appropriate affect, no gross  deficits        Normal speech        CN2-12 intact and symmetric        DTR 2+ and symmetric        Muscle strength 5/5 and symmetric    Regular rate and rhythm    Clear to auscultation bilaterally    Abdomen soft, nontender, nondistended.    Fundus nontender, size consistent with dates      Cephalic      EFW 3200    Gyn/      Normal female external genitalia.      Vault well-estrogenized, well-supported.      Pelvis adequate/gynecoid.        Cervix:           Dilation: Fingertip         Effacement: 20%         Station: Ballotable  Consistency: Medium         Position: Posterior   Presentation: Vertex    FHR: 125 moderate variability.  (+) accels, no decels.  Reactive    [place]:  q 2 minutes, not felt    Ultrasound:      Single live active IUP, cephalic    RAJAN:      Q1: 2.93cm      Q2: 2.02cm      Q3: 2.71cm      Q4: 3.62cm      Total: 11.28cm      Prenatal labs/  Lab Results   Component Value Date    ABO O 10/11/2024    RH Positive 10/11/2024    ABS Normal 10/11/2024    HGB 12.4 03/23/2025     03/23/2025    EXTRUBELIGGQ immune 10/11/2024    RPR Non Reactive 01/31/2025    HEPBSAG negative 10/11/2024    HEPCAB Non Reactive 10/11/2024    HIVAGAB Non-Reactive 10/11/2024    GC neg 10/18/2024    [id number] 105 01/31/2025         GBS:  Unknown

## 2025-03-23 NOTE — TELEPHONE ENCOUNTER
"FOLLOW UP: no follow up needed    REASON FOR CONVERSATION: Hypertension - Pregnant    SYMPTOMS:  mild headache, swelling in ankles, feet, and hands, mild cramping    OTHER: Per on call provider, patient should be seen in [facility] L[facility] triage. Informed patient and she verbalized understanding.    DISPOSITION: Go to  Now (overriding See HPC Within 4 Hours (Or PCP Triage))            Reason for Disposition   [1] Pregnant 20 or more weeks AND [2] Systolic BP >= 140 OR Diastolic >= 90    Answer Assessment - Initial Assessment Questions  1. BLOOD PRESSURE: \"What is your blood pressure?\" \"Did you take at least two measurements 5 minutes apart?\"        148/88    2. ONSET: \"When did you take your blood pressure?\"        3/23    3. HOW: \"How did you take your blood pressure?\" (e.g., automatic home BP monitor, visiting nurse)        Automatic Bp monitor    4. HISTORY: \"Do you have a history of high blood pressure?\" \"Were you diagnosed with preeclampsia during this or previous pregnancies?\"        No, sister had preeclampsia. Pt is on aspirin    5. MEDICINES: \"Are you taking any medicines for blood pressure?\" \"Have you missed any doses recently?\"        No    6. PREGNANCY: \"How many weeks pregnant are you?\" \"How many babies are you carrying?\" (e.g., single baby, twins)        35w5d    7. ALIDA: \"What date are you expecting to deliver?\"        4/22/25    8. OTHER SYMPTOMS: \"Do you have any other symptoms?\" (e.g., abdomen pain, chest pain, difficulty breathing, hand or face swelling, headache, vision changes)       New swelling in ankles, feet, and hands- started yesterday . Occasional lower back and abdominal cramping.    Protocols used: Pregnancy - High Blood Pressure-Adult-AH    "

## 2025-03-23 NOTE — ASSESSMENT & PLAN NOTE
"New diagnosis.  Presented with headache.  BP elevated in \"mild range.\"  Laboratory studies without evidence of end-organ damage, and P/C ratio < 0.3.      Has not met criteria yet as not 2 BP > 4 hours apart.  Headache abated with Tylenol/Reglan/Benadryl.  Long discussion with her and her  regarding GHTN/Preeclampsia, diagnosis, treatment, and delivery timing.  All qs answered.  Given that this is a new diagnosis, and presentation was with symptoms, will keep overnight to observe.  --> Admit for obs.  --> Follow BP/Sx.  --> Repeat labs in AM.   "

## 2025-03-23 NOTE — PLAN OF CARE
Problem: ANTEPARTUM  Goal: Maintain pregnancy as long as maternal and/or fetal condition is stable  Description: INTERVENTIONS:- Maternal surveillance- Fetal surveillance- Monitor uterine activity- Medications as ordered- Bedrest  Outcome: Progressing

## 2025-03-23 NOTE — TELEPHONE ENCOUNTER
"Regardin weeks pregnant / headache and elevated BP / swelling  ----- Message from Jennifer HERRERA sent at 3/23/2025  3:18 PM EDT -----  Pt stated, \"I am 35 weeks pregnant. I have a slight headache, I have been tracking my BP. It's currently 148/88. I am considered high risk due to my sister having preeclampsia. I have been taking baby aspirin. I have new swelling in my feet/ankles/hands. I have had mild abdominal and back cramping that I spoke ot a provider with earlier in the week about.\"    "

## 2025-03-23 NOTE — ASSESSMENT & PLAN NOTE
Prematurity.  35.5 weeks gestational age.  I suspect that she may deliver in the next 7 days.  We discussed late  corticosteroids and the benefits and risks of these, and she elects to receive these to enhance FLM and reduce the risk of transient respiratory complications of the .  --> Betamethasone to enhance FLM.  --> NICU consult.    GBS unknown/.  --> GBS sent.  --> Will need prophylaxis unless this returns negative.    Fetal status reassuring.  Reactive NST, normal RAJAN.  --> NST bid while in-house.

## 2025-03-24 PROBLEM — O14.93 PRE-ECLAMPSIA IN THIRD TRIMESTER: Status: ACTIVE | Noted: 2025-03-24

## 2025-03-24 LAB
ALBUMIN SERPL BCG-MCNC: 3.3 G/DL (ref 3.5–5)
ALBUMIN SERPL BCG-MCNC: 3.4 G/DL (ref 3.5–5)
ALBUMIN SERPL BCG-MCNC: 3.4 G/DL (ref 3.5–5)
ALBUMIN SERPL BCG-MCNC: 3.5 G/DL (ref 3.5–5)
ALP SERPL-CCNC: 134 U/L (ref 34–104)
ALP SERPL-CCNC: 154 U/L (ref 34–104)
ALP SERPL-CCNC: 159 U/L (ref 34–104)
ALP SERPL-CCNC: 165 U/L (ref 34–104)
ALT SERPL W P-5'-P-CCNC: 16 U/L (ref 7–52)
ALT SERPL W P-5'-P-CCNC: 16 U/L (ref 7–52)
ALT SERPL W P-5'-P-CCNC: 17 U/L (ref 7–52)
ALT SERPL W P-5'-P-CCNC: 21 U/L (ref 7–52)
ANION GAP SERPL CALCULATED.3IONS-SCNC: 10 MMOL/L (ref 4–13)
ANION GAP SERPL CALCULATED.3IONS-SCNC: 11 MMOL/L (ref 4–13)
ANION GAP SERPL CALCULATED.3IONS-SCNC: 8 MMOL/L (ref 4–13)
ANION GAP SERPL CALCULATED.3IONS-SCNC: 9 MMOL/L (ref 4–13)
AST SERPL W P-5'-P-CCNC: 20 U/L (ref 13–39)
AST SERPL W P-5'-P-CCNC: 21 U/L (ref 13–39)
AST SERPL W P-5'-P-CCNC: 25 U/L (ref 13–39)
AST SERPL W P-5'-P-CCNC: 26 U/L (ref 13–39)
BILIRUB SERPL-MCNC: 0.25 MG/DL (ref 0.2–1)
BILIRUB SERPL-MCNC: 0.26 MG/DL (ref 0.2–1)
BILIRUB SERPL-MCNC: 0.28 MG/DL (ref 0.2–1)
BILIRUB SERPL-MCNC: 0.3 MG/DL (ref 0.2–1)
BUN SERPL-MCNC: 7 MG/DL (ref 5–25)
BUN SERPL-MCNC: 8 MG/DL (ref 5–25)
BUN SERPL-MCNC: 8 MG/DL (ref 5–25)
BUN SERPL-MCNC: 9 MG/DL (ref 5–25)
CALCIUM ALBUM COR SERPL-MCNC: 7.8 MG/DL (ref 8.3–10.1)
CALCIUM ALBUM COR SERPL-MCNC: 8.9 MG/DL (ref 8.3–10.1)
CALCIUM ALBUM COR SERPL-MCNC: 9.4 MG/DL (ref 8.3–10.1)
CALCIUM SERPL-MCNC: 7.3 MG/DL (ref 8.4–10.2)
CALCIUM SERPL-MCNC: 8.1 MG/DL (ref 8.4–10.2)
CALCIUM SERPL-MCNC: 8.4 MG/DL (ref 8.4–10.2)
CALCIUM SERPL-MCNC: 8.8 MG/DL (ref 8.4–10.2)
CHLORIDE SERPL-SCNC: 105 MMOL/L (ref 96–108)
CO2 SERPL-SCNC: 19 MMOL/L (ref 21–32)
CO2 SERPL-SCNC: 21 MMOL/L (ref 21–32)
CO2 SERPL-SCNC: 22 MMOL/L (ref 21–32)
CO2 SERPL-SCNC: 22 MMOL/L (ref 21–32)
CREAT SERPL-MCNC: 0.61 MG/DL (ref 0.6–1.3)
CREAT SERPL-MCNC: 0.62 MG/DL (ref 0.6–1.3)
CREAT SERPL-MCNC: 0.64 MG/DL (ref 0.6–1.3)
CREAT SERPL-MCNC: 0.74 MG/DL (ref 0.6–1.3)
CREAT UR-MCNC: 27.2 MG/DL
ERYTHROCYTE [DISTWIDTH] IN BLOOD BY AUTOMATED COUNT: 12.2 % (ref 11.6–15.1)
ERYTHROCYTE [DISTWIDTH] IN BLOOD BY AUTOMATED COUNT: 12.4 % (ref 11.6–15.1)
GFR SERPL CREATININE-BSD FRML MDRD: 112 ML/MIN/1.73SQ M
GFR SERPL CREATININE-BSD FRML MDRD: 123 ML/MIN/1.73SQ M
GFR SERPL CREATININE-BSD FRML MDRD: 124 ML/MIN/1.73SQ M
GFR SERPL CREATININE-BSD FRML MDRD: 125 ML/MIN/1.73SQ M
GLUCOSE SERPL-MCNC: 139 MG/DL (ref 65–140)
GLUCOSE SERPL-MCNC: 162 MG/DL (ref 65–140)
GLUCOSE SERPL-MCNC: 93 MG/DL (ref 65–140)
GLUCOSE SERPL-MCNC: 93 MG/DL (ref 65–140)
HCT VFR BLD AUTO: 35.9 % (ref 34.8–46.1)
HCT VFR BLD AUTO: 36.5 % (ref 34.8–46.1)
HCT VFR BLD AUTO: 37 % (ref 34.8–46.1)
HCT VFR BLD AUTO: 37.6 % (ref 34.8–46.1)
HGB BLD-MCNC: 12.2 G/DL (ref 11.5–15.4)
HGB BLD-MCNC: 12.4 G/DL (ref 11.5–15.4)
HGB BLD-MCNC: 12.5 G/DL (ref 11.5–15.4)
HGB BLD-MCNC: 12.8 G/DL (ref 11.5–15.4)
MAGNESIUM SERPL-MCNC: 4 MG/DL (ref 1.9–2.7)
MAGNESIUM SERPL-MCNC: 4.4 MG/DL (ref 1.9–2.7)
MAGNESIUM SERPL-MCNC: 5 MG/DL (ref 1.9–2.7)
MCH RBC QN AUTO: 32 PG (ref 26.8–34.3)
MCH RBC QN AUTO: 32.1 PG (ref 26.8–34.3)
MCH RBC QN AUTO: 32.3 PG (ref 26.8–34.3)
MCH RBC QN AUTO: 32.4 PG (ref 26.8–34.3)
MCHC RBC AUTO-ENTMCNC: 33.8 G/DL (ref 31.4–37.4)
MCHC RBC AUTO-ENTMCNC: 34 G/DL (ref 31.4–37.4)
MCV RBC AUTO: 95 FL (ref 82–98)
PLATELET # BLD AUTO: 147 THOUSANDS/UL (ref 149–390)
PLATELET # BLD AUTO: 155 THOUSANDS/UL (ref 149–390)
PLATELET # BLD AUTO: 166 THOUSANDS/UL (ref 149–390)
PLATELET # BLD AUTO: 169 THOUSANDS/UL (ref 149–390)
PMV BLD AUTO: 11.5 FL (ref 8.9–12.7)
PMV BLD AUTO: 11.6 FL (ref 8.9–12.7)
PMV BLD AUTO: 11.6 FL (ref 8.9–12.7)
PMV BLD AUTO: 11.8 FL (ref 8.9–12.7)
POTASSIUM SERPL-SCNC: 3.6 MMOL/L (ref 3.5–5.3)
POTASSIUM SERPL-SCNC: 3.7 MMOL/L (ref 3.5–5.3)
POTASSIUM SERPL-SCNC: 3.8 MMOL/L (ref 3.5–5.3)
POTASSIUM SERPL-SCNC: 4.1 MMOL/L (ref 3.5–5.3)
PROT SERPL-MCNC: 6.6 G/DL (ref 6.4–8.4)
PROT SERPL-MCNC: 6.6 G/DL (ref 6.4–8.4)
PROT SERPL-MCNC: 6.7 G/DL (ref 6.4–8.4)
PROT SERPL-MCNC: 7 G/DL (ref 6.4–8.4)
PROT UR-MCNC: 4.5 MG/DL
PROT/CREAT UR: 0.2 MG/G{CREAT} (ref 0–0.1)
RBC # BLD AUTO: 3.78 MILLION/UL (ref 3.81–5.12)
RBC # BLD AUTO: 3.86 MILLION/UL (ref 3.81–5.12)
RBC # BLD AUTO: 3.91 MILLION/UL (ref 3.81–5.12)
RBC # BLD AUTO: 3.95 MILLION/UL (ref 3.81–5.12)
SODIUM SERPL-SCNC: 135 MMOL/L (ref 135–147)
SODIUM SERPL-SCNC: 135 MMOL/L (ref 135–147)
SODIUM SERPL-SCNC: 136 MMOL/L (ref 135–147)
SODIUM SERPL-SCNC: 136 MMOL/L (ref 135–147)
TREPONEMA PALLIDUM IGG+IGM AB [PRESENCE] IN SERUM OR PLASMA BY IMMUNOASSAY: NORMAL
URATE SERPL-MCNC: 4.3 MG/DL (ref 2–7.5)
URATE SERPL-MCNC: 4.5 MG/DL (ref 2–7.5)
URATE SERPL-MCNC: 4.9 MG/DL (ref 2–7.5)
WBC # BLD AUTO: 11.5 THOUSAND/UL (ref 4.31–10.16)
WBC # BLD AUTO: 12.04 THOUSAND/UL (ref 4.31–10.16)
WBC # BLD AUTO: 12.26 THOUSAND/UL (ref 4.31–10.16)
WBC # BLD AUTO: 9.96 THOUSAND/UL (ref 4.31–10.16)

## 2025-03-24 PROCEDURE — 80053 COMPREHEN METABOLIC PANEL: CPT | Performed by: OBSTETRICS & GYNECOLOGY

## 2025-03-24 PROCEDURE — 83735 ASSAY OF MAGNESIUM: CPT | Performed by: OBSTETRICS & GYNECOLOGY

## 2025-03-24 PROCEDURE — 82570 ASSAY OF URINE CREATININE: CPT | Performed by: OBSTETRICS & GYNECOLOGY

## 2025-03-24 PROCEDURE — G0379 DIRECT REFER HOSPITAL OBSERV: HCPCS

## 2025-03-24 PROCEDURE — NC001 PR NO CHARGE: Performed by: OBSTETRICS & GYNECOLOGY

## 2025-03-24 PROCEDURE — 84550 ASSAY OF BLOOD/URIC ACID: CPT | Performed by: OBSTETRICS & GYNECOLOGY

## 2025-03-24 PROCEDURE — 85027 COMPLETE CBC AUTOMATED: CPT | Performed by: OBSTETRICS & GYNECOLOGY

## 2025-03-24 PROCEDURE — 84156 ASSAY OF PROTEIN URINE: CPT | Performed by: OBSTETRICS & GYNECOLOGY

## 2025-03-24 RX ORDER — FAMOTIDINE 20 MG/1
20 TABLET, FILM COATED ORAL EVERY 12 HOURS PRN
Status: DISCONTINUED | OUTPATIENT
Start: 2025-03-24 | End: 2025-03-27 | Stop reason: HOSPADM

## 2025-03-24 RX ORDER — CALCIUM GLUCONATE 94 MG/ML
1 INJECTION, SOLUTION INTRAVENOUS ONCE AS NEEDED
Status: DISCONTINUED | OUTPATIENT
Start: 2025-03-24 | End: 2025-03-26

## 2025-03-24 RX ORDER — MAGNESIUM SULFATE HEPTAHYDRATE 40 MG/ML
2 INJECTION, SOLUTION INTRAVENOUS CONTINUOUS
Status: DISCONTINUED | OUTPATIENT
Start: 2025-03-24 | End: 2025-03-26

## 2025-03-24 RX ORDER — SODIUM CHLORIDE, SODIUM LACTATE, POTASSIUM CHLORIDE, CALCIUM CHLORIDE 600; 310; 30; 20 MG/100ML; MG/100ML; MG/100ML; MG/100ML
75 INJECTION, SOLUTION INTRAVENOUS CONTINUOUS
Status: DISCONTINUED | OUTPATIENT
Start: 2025-03-24 | End: 2025-03-25

## 2025-03-24 RX ORDER — DOCUSATE SODIUM 100 MG/1
100 CAPSULE, LIQUID FILLED ORAL 2 TIMES DAILY
Status: DISCONTINUED | OUTPATIENT
Start: 2025-03-24 | End: 2025-03-25

## 2025-03-24 RX ORDER — MAGNESIUM SULFATE HEPTAHYDRATE 40 MG/ML
4 INJECTION, SOLUTION INTRAVENOUS ONCE
Status: COMPLETED | OUTPATIENT
Start: 2025-03-24 | End: 2025-03-24

## 2025-03-24 RX ADMIN — DIPHENHYDRAMINE HYDROCHLORIDE 25 MG: 25 TABLET ORAL at 15:18

## 2025-03-24 RX ADMIN — DOCUSATE SODIUM 100 MG: 100 CAPSULE, LIQUID FILLED ORAL at 17:55

## 2025-03-24 RX ADMIN — BETAMETHASONE SODIUM PHOSPHATE AND BETAMETHASONE ACETATE 12 MG: 3; 3 INJECTION, SUSPENSION INTRA-ARTICULAR; INTRALESIONAL; INTRAMUSCULAR at 17:56

## 2025-03-24 RX ADMIN — ACETAMINOPHEN 975 MG: 325 TABLET, FILM COATED ORAL at 08:30

## 2025-03-24 RX ADMIN — ACETAMINOPHEN 975 MG: 325 TABLET, FILM COATED ORAL at 15:18

## 2025-03-24 RX ADMIN — PRENATAL VITAMINS-IRON FUMARATE 27 MG IRON-FOLIC ACID 0.8 MG TABLET 1 TABLET: at 08:30

## 2025-03-24 RX ADMIN — MAGNESIUM SULFATE HEPTAHYDRATE 2 G/HR: 40 INJECTION, SOLUTION INTRAVENOUS at 10:40

## 2025-03-24 RX ADMIN — SODIUM CHLORIDE, SODIUM LACTATE, POTASSIUM CHLORIDE, AND CALCIUM CHLORIDE 125 ML/HR: .6; .31; .03; .02 INJECTION, SOLUTION INTRAVENOUS at 10:08

## 2025-03-24 RX ADMIN — METOCLOPRAMIDE 10 MG: 10 TABLET ORAL at 23:33

## 2025-03-24 RX ADMIN — MAGNESIUM SULFATE HEPTAHYDRATE 4 G: 40 INJECTION, SOLUTION INTRAVENOUS at 10:08

## 2025-03-24 RX ADMIN — SODIUM CHLORIDE, SODIUM LACTATE, POTASSIUM CHLORIDE, AND CALCIUM CHLORIDE 125 ML/HR: .6; .31; .03; .02 INJECTION, SOLUTION INTRAVENOUS at 17:33

## 2025-03-24 RX ADMIN — FAMOTIDINE 20 MG: 20 TABLET, FILM COATED ORAL at 10:27

## 2025-03-24 RX ADMIN — DIPHENHYDRAMINE HYDROCHLORIDE 25 MG: 25 TABLET ORAL at 23:33

## 2025-03-24 RX ADMIN — MAGNESIUM SULFATE HEPTAHYDRATE 2 G/HR: 40 INJECTION, SOLUTION INTRAVENOUS at 19:06

## 2025-03-24 RX ADMIN — DIPHENHYDRAMINE HYDROCHLORIDE 25 MG: 25 TABLET ORAL at 08:30

## 2025-03-24 RX ADMIN — METOCLOPRAMIDE 10 MG: 10 TABLET ORAL at 08:30

## 2025-03-24 RX ADMIN — Medication 25 MCG: at 10:27

## 2025-03-24 RX ADMIN — METOCLOPRAMIDE 10 MG: 10 TABLET ORAL at 15:18

## 2025-03-24 RX ADMIN — ACETAMINOPHEN 975 MG: 325 TABLET, FILM COATED ORAL at 23:33

## 2025-03-24 NOTE — ASSESSMENT & PLAN NOTE
Prematurity.  35.5 weeks gestational age.  I suspect that she may deliver in the next 7 days.  We discussed late  corticosteroids and the benefits and risks of these, and she elects to receive these to enhance FLM and reduce the risk of transient respiratory complications of the .  --> Betamethasone to enhance FLM.  --> NICU consult.    GBS unknown/.  --> GBS pending.  --> Will need prophylaxis unless this returns negative.

## 2025-03-24 NOTE — OB LABOR/OXYTOCIN SAFETY PROGRESS
Labor Progress Note - Beatriz Sánchez 24 y.o. female MRN: [id number]    Unit/Bed#: -01 Encounter: [id number]       Contraction Frequency (minutes): 1-3  Contraction Intensity: Mild  Uterine Activity Characteristics: Irregular  Cervical Dilation: Fingertip        Cervical Effacement: 50  Fetal Station: Ballotable  Baseline Rate (FHR): 115 bpm  Fetal Heart Rate (FHT): 122 BPM  FHR Category: 1               Vital Signs:   Vitals:    03/24/25 1258   BP: 140/80   Pulse: 78   Resp: 20   Temp: 97.7 °F (36.5 °C)   SpO2: 100%       Notes/comments:     Slight effacement. Unable to get caballero in, will attempt at next exam. Cytotec once contractions space out a bit more.     Fior Gagnon DO 3/24/2025 2:31 PM

## 2025-03-24 NOTE — UTILIZATION REVIEW
"Initial Clinical Review  OBSERVATION ADMISSION 3/23/2025 1802  CONVERTED TO   INPATIENT ADMISSION 3/24/2025 DUE TO gHTN transitioning TO SEVERE SYMPT W SEVERE RANGE BP (UNSUSTAINED) REQ IOL   Admission: Date/Time/Statement:   Admission Orders (From admission, onward)       Ordered        03/24/25 1618  INPATIENT ADMISSION  Once            03/23/25 1802  Place in Observation  Once                          Orders Placed This Encounter   Procedures    INPATIENT ADMISSION     Standing Status:   Standing     Number of Occurrences:   1     Level of Care:   Med Surg [16]     Estimated length of stay:   More than 2 Midnights     Certification:   I certify that inpatient services are medically necessary for this patient for a duration of greater than two midnights. See H&P and MD Progress Notes for additional information about the patient's course of treatment.     ED Arrival Information       Patient not seen in ED                       Chief Complaint   Patient presents with    Hypertension       Initial Presentation: 24 y.o. female 35w5d unknown GBS presents w HA ongoing for  two days with relatively mild though persistent headache, frontal.  No assoc neuro symptoms, no VF changes. Rare UC, no VB/LoF. (+)FM  Observation admission new DX gHTN  with headache, concern for pre eclampsia w SF.      EXAM  BP elevated in \"mild range.\"    Labs without evidence of end-organ damage, and P/C ratio < 0.3.    Pre eclampsia  Has not met criteria yet as not 2 BP > 4 hours apart.  PLAN cont fetal monitoring/ NST BID; trend BP/ sympt; repeat AM labs; BTM, NICU consult;   Date: 3/24/2025   Day 2: changed to Inpatient  35w6d with GHTN newly DX Pre eclampsia with severe symptoms via severe range (unsustained) BP, given persistent headache   (reports headache is still present. Resolved for 2 hours last night and  back since.)  Proceed with IOL per ACOG guidelines.   Start IV MAG, trend BP/ labs/ sympt  IOL plan:  Cytotec,  Perdomo via cervix. " If still pregnant tonight give 2nd BTM; IV Pit, AROM, epidural    @ 0231 PM  Contraction Frequency (minutes): 1-3  Contraction Intensity: Mild  Uterine Activity Characteristics: Irregular  Cervical Dilation: Fingertip  Cervical Effacement: 50  Fetal Station: Ballotable  FHR Category: 1  Unable to get caballero in, will attempt at next exam. Cytotec once contractions space out a bit more.     @ 0804 PM  Caballero bulb  inserted into cervix  Contraction Frequency (minutes): 3-5  Contraction Intensity: Mild  Uterine Activity Characteristics: Irregular  Cervical Dilation: 1-2  Cervical Effacement: 60  Fetal Station: -3  Baseline Rate (FHR): 115 bpm  Fetal Heart Rate (FHT): 122 BPM  FHR Category: 1    DATE  3/25/2025  1205 AM   36w0d IOL for PEC w/severe features    -BP stable, normal range now  continue magnesium per protocol  continue caballero balloon; can start pitocin once out  Contraction Frequency (minutes): 2-4  Contraction Intensity: Mild  Uterine Activity Characteristics: Regular  Cervical Dilation: 1-2  Cervical Effacement: 60  Fetal Station: -3  FHR Category: 1    @ 0708 AM Balloon out last night, betsy on own but contractions spaced; patient wanted to rest and be rechecked after breakfast prior to restarting pitocin.  - Bps stable and asymptomatic       0840  s/p cytotec x 1 and caballero balloon.  After balloon out patient wishes to see if went into labor.  Ctx spaced so pitocin started.  She is receiving PCN for GBS unknown.  Reviewed plan.  Will continue pitocin and AROM when able  Oxytocin: 4 mayuri-units/min  Contraction Frequency (minutes): occasional  Contraction Intensity: Mild  Uterine Activity Characteristics: Irregular  Cervical Dilation: 4  Cervical Effacement: 50  Fetal Station: -3  FHR Category: 1    @ 1050 AM  AROM'd with fluid old blood tinged vs meconium     Epidural  AROM'd with fluid old blood tinged vs meconium     @ 0323 PM Entering active Labor  Oxytocin: 16 mayuri-units/min  Contraction Frequency  (minutes): 2-3  Contraction Intensity: Mild/Moderate  Uterine Activity Characteristics: Regular  Cervical Dilation: 6  Cervical Effacement: 90  Fetal Station: -2  FHR Category: 1    @ 0505 PM  Doing well on IV MAG GTT, repeat labs  Oxytocin: 22 mayuri-units/min  Contraction Frequency (minutes): 2-4  Contraction Intensity: Mild/Moderate  Uterine Activity Characteristics: Irregular  Cervical Dilation: 9  Cervical Effacement: 90  Fetal Station: 0  FHT baseline: 115bpm  Variability: moderate  Accels: present  Decels: Absent  Category 1      3/25/2025 @ 1829   Viable baby girl  Apgars 8 & 9  Birthwt  2353 GM (5 lb 3 oz)       ED Treatment-Medication Administration - No Administrations Displayed (No Start Event Found)       None            Scheduled Medications:  docusate sodium, 100 mg, Oral, BID  furosemide, 20 mg, Oral, Daily  NIFEdipine, 30 mg, Oral, Daily  prenatal multivitamin, 1 tablet, Oral, Daily    3/24 IV Bolus=  magnesium sulfate 4 g/100 mL IVPB (premix) 4 g  Dose: 4 g  Freq: Once Route: IV  Last Dose: Stopped (25 1030)  Start: 25 0945 End: 25 1030       3/24 VA x1 =  miSOPROStol (Cytotec) split tablet 25 mcg  Dose: 25 mcg  Freq: Once Route: VA  Start: 25 1015 End: 25 1027    penicillin G (PFIZERPEN-G) in 0.9% sodium chloride 100 mL IVPB 2.5 Million Units  Dose: 2.5 Million Units  Freq: Every 4 hours Route: IV  Continuous IV Infusions:  lactated ringers, 125 mL/hr, Intravenous, Continuous  magnesium sulfate, 2 g/hr, Intravenous, Continuous      PRN Meds:  acetaminophen, 650 mg, Oral, Q4H PRN  benzocaine-menthol-lanolin-aloe, 1 Application, Topical, Q6H PRN  calcium carbonate, 1,000 mg, Oral, Daily PRN  diphenhydrAMINE, 25 mg, Oral, Q6H PRN  famotidine, 20 mg, Oral, Q12H PRN  hydrocortisone, 1 Application, Topical, Daily PRN  hydrOXYzine HCL, 25 mg, Oral, HS PRN  ibuprofen, 600 mg, Oral, Q6H PRN  metoclopramide, 10 mg, Intravenous, Q6H PRN  ondansetron, 4 mg, Intravenous, Q8H  PRN  oxyCODONE, 5 mg, Oral, Q3H PRN  witch hazel-glycerin, 1 Pad, Topical, Q4H PRN      ED Triage Vitals   Temperature Pulse Respirations Blood Pressure SpO2 Pain Score   03/23/25 1934 03/23/25 1700 03/23/25 1700 03/23/25 1700 03/23/25 1934 03/23/25 1704   97.8 °F (36.6 °C) 77 18 145/86 100 % 2     Weight (last 2 days)       Date/Time Weight    03/25/25 0825 76.2 (168)            Vital Signs (last 3 days)       Date/Time Temp Pulse Resp BP MAP (mmHg) SpO2 O2 Device Cardiac (WDL) Patient Position - Orthostatic VS Pain    03/26/25 1100 98.1 °F (36.7 °C) 70 18 134/64 93 98 % None (Room air) -- Lying --    03/26/25 1030 97.7 °F (36.5 °C) 59 20 139/83 -- 97 % -- -- Lying --    03/26/25 0700 97.6 °F (36.4 °C) 61 18 147/72 103 98 % None (Room air) -- Lying --    03/26/25 0430 97.7 °F (36.5 °C) 64 18 126/81 -- -- -- -- Sitting --    03/26/25 0238 -- -- -- -- -- -- -- -- -- 2    03/26/25 0200 98.2 °F (36.8 °C) 68 18 138/94 -- -- -- -- Lying --    03/25/25 2300 98.2 °F (36.8 °C) 60 18 144/92 -- -- None (Room air) WDL Lying --    03/25/25 2036 -- 75 18 137/79 -- 96 % -- -- Sitting 2    03/25/25 2000 -- 78 18 145/74 -- 97 % None (Room air) -- Sitting --    03/25/25 1945 -- 76 18 148/72 -- 97 % -- -- Sitting No Pain    03/25/25 1923 -- 87 18 143/66 -- -- -- -- Sitting No Pain    03/25/25 1908 97.8 °F (36.6 °C) 83 18 157/76 -- -- -- -- Sitting No Pain    03/25/25 1851 -- 80 18 145/81 -- -- -- -- Sitting No Pain    03/25/25 1845 -- -- -- 157/100 -- -- -- -- -- --    03/25/25 1830 -- -- -- 136/79 -- -- -- -- -- --    03/25/25 1808 -- 89 -- 139/86 -- -- -- -- -- --    03/25/25 1800 98.3 °F (36.8 °C) 93 20 141/84 -- -- -- -- Sitting No Pain    03/25/25 1745 -- 85 -- 137/76 -- -- -- -- -- --    03/25/25 1730 -- 96 -- 151/85 -- -- -- -- -- --    03/25/25 1715 -- 89 -- 141/80 -- -- -- -- -- --    03/25/25 1700 -- 76 -- 133/69 -- -- -- -- -- No Pain    03/25/25 1645 -- 73 -- 133/68 -- -- -- -- -- --    03/25/25 1630 -- 70 -- 122/72 -- --  -- -- -- --    03/25/25 1615 97.8 °F (36.6 °C) 82 16 133/101 -- 99 % -- -- Lying No Pain    03/25/25 1601 -- 75 -- 137/89 -- -- -- -- -- --    03/25/25 1600 -- -- -- 137/89 -- -- -- -- -- No Pain    03/25/25 1545 -- 71 -- 142/86 -- -- -- -- -- --    03/25/25 1526 -- 76 -- 141/86 -- -- -- -- -- --    03/25/25 1515 -- 67 -- 135/82 -- -- -- -- -- --    03/25/25 1507 -- 67 -- 135/82 -- -- -- -- -- --    03/25/25 1500 97.7 °F (36.5 °C) 75 -- 122/74 -- -- -- -- -- No Pain    03/25/25 1445 -- 84 -- 130/78 -- -- -- -- -- --    03/25/25 1425 -- 77 -- 130/75 -- -- -- -- -- No Pain    03/25/25 1405 -- 81 -- 124/71 -- -- -- -- -- --    03/25/25 1400 -- -- -- -- -- 99 % -- -- -- --    03/25/25 1358 -- 75 -- 136/77 -- 98 % -- -- -- --    03/25/25 1355 -- 77 -- 130/76 -- 98 % -- -- -- --    03/25/25 1354 -- 78 -- 129/79 -- 98 % None (Room air) -- -- --    03/25/25 1350 -- 81 -- 137/84 -- 99 % -- -- -- --    03/25/25 1347 -- 78 -- 128/80 -- -- -- -- -- --    03/25/25 1344 -- 71 -- 136/83 -- -- -- -- -- --    03/25/25 1340 -- 77 -- 147/89 -- -- -- -- -- --    03/25/25 1337 -- 71 -- 151/90 -- -- -- -- -- --    03/25/25 1335 -- 75 -- 146/91 -- -- -- -- -- --    03/25/25 1327 97.9 °F (36.6 °C) -- -- -- -- -- -- -- -- --    03/25/25 1315 -- -- -- 136/76 -- -- -- -- -- --    03/25/25 1309 -- -- -- -- -- -- -- -- -- 10 - Worst Possible Pain    03/25/25 1205 -- 76 -- 146/84 -- 100 % None (Room air) -- -- 1    03/25/25 1201 97.7 °F (36.5 °C) -- -- -- -- -- -- -- -- --    03/25/25 1115 97.9 °F (36.6 °C) -- -- 135/78 -- -- -- -- -- --    03/25/25 1015 -- 68 -- 119/65 -- 100 % -- -- -- --    03/25/25 0915 -- -- -- 129/75 -- -- -- -- -- --    03/25/25 0903 -- -- -- -- -- -- -- -- -- 2    03/25/25 0856 -- 77 -- 138/86 -- -- -- -- -- --    03/25/25 0825 -- 77 -- 125/76 -- 98 % -- -- -- --    03/25/25 0815 97.8 °F (36.6 °C) 77 18 135/82 -- 100 % -- -- Sitting 1    03/25/25 0635 -- 78 16 135/87 -- 97 % None (Room air) -- Lying --    03/25/25 0416  -- 77 16 145/76 -- -- -- -- Lying --    03/25/25 0103 97.7 °F (36.5 °C) 74 16 103/57 -- 97 % None (Room air) -- Lying --    03/24/25 2333 -- -- -- -- -- -- -- -- -- 4    03/24/25 2203 98.1 °F (36.7 °C) 85 16 107/58 -- 98 % None (Room air) -- Lying --    03/24/25 1918 -- 85 -- 142/85 -- 98 % -- -- Lying --    03/24/25 1732 97.8 °F (36.6 °C) 80 18 139/90 -- -- -- -- -- --    03/24/25 1728 -- 87 -- -- -- 100 % -- -- -- --    03/24/25 1521 -- 82 -- -- -- 98 % -- -- -- --    03/24/25 1520 97.7 °F (36.5 °C) 80 16 139/83 -- -- -- -- -- --    03/24/25 1518 -- -- -- -- -- -- -- -- -- 3    03/24/25 1500 -- -- -- -- -- -- None (Room air) -- -- --    03/24/25 1300 -- -- -- -- -- -- None (Room air) -- -- 1    03/24/25 1258 97.7 °F (36.5 °C) 78 20 140/80 -- 100 % -- -- Sitting --    03/24/25 1123 -- 76 -- 147/80 -- -- -- -- -- --    03/24/25 0835 -- 84 -- 142/98 -- -- -- -- -- --    03/24/25 0830 -- -- -- -- -- -- -- -- -- 3    03/24/25 0814 98.7 °F (37.1 °C) 84 19 164/82 -- 99 % -- -- Sitting --    03/24/25 0600 -- -- -- -- -- -- -- -- -- 3    03/24/25 0300 98.2 °F (36.8 °C) 67 18 121/67 -- -- -- -- Lying --    03/23/25 2323 97.9 °F (36.6 °C) 75 18 145/86 -- -- -- -- -- --    03/23/25 1934 97.8 °F (36.6 °C) 68 18 152/96 114 100 % None (Room air) -- Sitting 1    03/23/25 1741 -- 68 -- 149/90 -- -- -- -- Sitting --    03/23/25 1727 -- 60 -- 153/88 -- -- -- -- Sitting --    03/23/25 1704 -- -- -- -- -- -- -- -- -- 2    03/23/25 1700 -- 77 18 145/86 -- -- -- -- Sitting --              Pertinent Labs/Diagnostic Test Results:   Radiology:  RADIOLOGY RESULTS   Final Interpretation by  (03/25 1242)        Cardiology:  No orders to display     GI:  No orders to display           Results from last 7 days   Lab Units 03/26/25  0755 03/25/25 2014 03/25/25  1036 03/25/25  0423 03/24/25  2159   WBC Thousand/uL 14.95* 23.02* 14.93* 12.82* 11.50*   HEMOGLOBIN g/dL 10.4* 11.7 12.5 13.4 12.2   HEMATOCRIT % 31.1* 34.6* 37.0 38.9 35.9   PLATELETS  "Thousands/uL 172 173 173 176 166   TOTAL NEUT ABS Thousands/µL 12.58* 19.63*  --   --   --          Results from last 7 days   Lab Units 03/26/25 0755 03/25/25 2011 03/25/25  1036 03/25/25  0423 03/24/25 2159 03/24/25  1522   SODIUM mmol/L 137  --  136 136 135 135   POTASSIUM mmol/L 4.2  --  4.0 3.8 3.6 4.1   CHLORIDE mmol/L 106  --  104 104 105 105   CO2 mmol/L 25  --  21 22 19* 22   ANION GAP mmol/L 6  --  11 10 11 8   BUN mg/dL 10  --  8 7 8 7   CREATININE mg/dL 0.73  --  0.71 0.66 0.74 0.62   EGFR ml/min/1.73sq m 114  --  117 122 112 124   CALCIUM mg/dL 7.4*  --  7.1* 7.3* 7.3* 8.1*   MAGNESIUM mg/dL 4.5* 6.4* 5.6* 5.4* 5.0* 4.4*     Results from last 7 days   Lab Units 03/26/25 0755 03/25/25 1036 03/25/25 0423 03/24/25 2159 03/24/25  1522   AST U/L 29 33 28 26 25   ALT U/L 22 25 24 21 17   ALK PHOS U/L 121* 157* 166* 154* 165*   TOTAL PROTEIN g/dL 5.8* 6.9 7.0 6.6 7.0   ALBUMIN g/dL 3.0* 3.4* 3.5 3.4* 3.5   TOTAL BILIRUBIN mg/dL 0.27 0.29 0.30 0.25 0.28         Results from last 7 days   Lab Units 03/26/25 0755 03/25/25 1036 03/25/25  0423 03/24/25  2159 03/24/25  1522 03/24/25  1304 03/24/25  0637 03/23/25  1709   GLUCOSE RANDOM mg/dL 83 111 126 162* 93 93 139 73             No results found for: \"BETA-HYDROXYBUTYRATE\"                                                                                       Results from last 7 days   Lab Units 03/24/25  1403 03/23/25  1709   CLARITY UA   --  Clear   COLOR UA   --  Light Yellow   SPEC GRAV UA   --  1.010   PH UA   --  7.5   GLUCOSE UA mg/dl  --  Negative   KETONES UA mg/dl  --  Negative   BLOOD UA   --  Negative   PROTEIN UA mg/dl  --  Negative   NITRITE UA   --  Negative   BILIRUBIN UA   --  Negative   UROBILINOGEN UA (BE) mg/dl  --  <2.0   LEUKOCYTES UA   --  Small*   WBC UA /hpf  --  2-4   RBC UA /hpf  --  None Seen   BACTERIA UA /hpf  --  Moderate*   EPITHELIAL CELLS WET PREP /hpf  --  Occasional   CREATININE UR mg/dL 27.2 33.8   PROTEIN UR mg/dL 4.5 " 6.6   PROT/CREAT RATIO UR  0.2* 0.2*                                                   Past Medical History:   Diagnosis Date    History of cold sores     Treated with valtrex as needed.    Migraine     without Aura    Vitiligo      Present on Admission:   Gestational hypertension, third trimester      Admitting Diagnosis: No admission diagnoses are documented for this encounter.  Age/Sex: 24 y.o. female    Network Utilization Review Department  ATTENTION: Please call with any questions or concerns to [phone] and carefully listen to the prompts so that you are directed to the right person. All voicemails are confidential.   For Discharge needs, contact Care Management DC Support Team at [phone] opt. [phone]  Send all requests for admission clinical reviews, approved or denied determinations and any other requests to dedicated fax number below belonging to the campus where the patient is receiving treatment. List of dedicated fax numbers for the Facilities:  FACILITY NAME UR FAX NUMBER   ADMISSION DENIALS (Administrative/Medical Necessity) [phone]   DISCHARGE SUPPORT TEAM (NETWORK) [phone]   PARENT CHILD HEALTH (Maternity/NICU/Pediatrics) [phone]   [facility] [phone]   [facility] [phone]   [facility] [phone]   [facility] [phone]   On license of [facility] [phone]   [facility] [phone]   [facility] [phone]   [facility] [phone]   [facility] [phone]   [facility] [phone]   [facility] [phone]   [facility] [phone]

## 2025-03-24 NOTE — PROGRESS NOTES
at 35+6 with GHTN now with severe symptoms and severe range BP (unsustained), given persistent headache will proceed with IOL per ACOG guidelines. Risks/benefits discussed and consent form signed.     Dr. Gagnon aware.

## 2025-03-24 NOTE — PROGRESS NOTES
Progress Note - OB/GYN   Name: Beatriz Sánchez 24 y.o. female I MRN: [id number]  Unit/Bed#: -01 I Date of Admission: 3/23/2025   Date of Service: 3/24/2025 I Hospital Day: 0     Assessment & Plan  35 weeks gestation of pregnancy  Prematurity.  35.5 weeks gestational age.  I suspect that she may deliver in the next 7 days.  We discussed late  corticosteroids and the benefits and risks of these, and she elects to receive these to enhance FLM and reduce the risk of transient respiratory complications of the .  --> Betamethasone to enhance FLM.  --> NICU consult.    GBS unknown/.  --> GBS pending.  --> Will need prophylaxis unless this returns negative.    Pre-eclampsia in third trimester  Now diagnosis with pre-eclampsia with severe features given that with persistent headache, severe range Bps.   Start magnesium  IOL with cytotec and then caballero once able to  If still pregnant tonight, will give second dose of beta  IOL consent form signed.     OB L&D Progress Note  Subjective   Doing well but reports headache is still present. Resolved for 2 hours last night and  back since.     Objective :  Temp:  [97.8 °F (36.6 °C)-98.7 °F (37.1 °C)] 98.7 °F (37.1 °C)  HR:  [60-84] 84  BP: (121-164)/(67-96) 164/82  Resp:  [18-19] 19  SpO2:  [99 %-100 %] 99 %  O2 Device: None (Room air)    Physical Exam  General: NAD  Abd: soft, NT  SVE: 0/20/-4  Ultrasound confirms cephalic position  NST reactive, cat 1      Lab Results: I have reviewed the following results:CBC/BMP:   .     25  0637   WBC 9.96   HGB 12.5   HCT 37.0   *   SODIUM 136   K 3.8      CO2 21   BUN 9   CREATININE 0.64   GLUC 139

## 2025-03-24 NOTE — ASSESSMENT & PLAN NOTE
Now diagnosis with pre-eclampsia with severe features given that with persistent headache, severe range Bps.   Start magnesium  IOL with cytotec and then caballero once able to  If still pregnant tonight, will give second dose of beta  IOL consent form signed.

## 2025-03-25 ENCOUNTER — ANESTHESIA EVENT (INPATIENT)
Dept: ANESTHESIOLOGY | Facility: HOSPITAL | Age: 25
End: 2025-03-25
Payer: COMMERCIAL

## 2025-03-25 ENCOUNTER — ANESTHESIA (INPATIENT)
Dept: ANESTHESIOLOGY | Facility: HOSPITAL | Age: 25
End: 2025-03-25
Payer: COMMERCIAL

## 2025-03-25 LAB
ALBUMIN SERPL BCG-MCNC: 3.4 G/DL (ref 3.5–5)
ALBUMIN SERPL BCG-MCNC: 3.5 G/DL (ref 3.5–5)
ALP SERPL-CCNC: 157 U/L (ref 34–104)
ALP SERPL-CCNC: 166 U/L (ref 34–104)
ALT SERPL W P-5'-P-CCNC: 24 U/L (ref 7–52)
ALT SERPL W P-5'-P-CCNC: 25 U/L (ref 7–52)
ANION GAP SERPL CALCULATED.3IONS-SCNC: 10 MMOL/L (ref 4–13)
ANION GAP SERPL CALCULATED.3IONS-SCNC: 11 MMOL/L (ref 4–13)
AST SERPL W P-5'-P-CCNC: 28 U/L (ref 13–39)
AST SERPL W P-5'-P-CCNC: 33 U/L (ref 13–39)
BASE EXCESS BLDCOV CALC-SCNC: -1.9 MMOL/L (ref 1–9)
BASOPHILS # BLD AUTO: 0.05 THOUSANDS/ÂΜL (ref 0–0.1)
BASOPHILS NFR BLD AUTO: 0 % (ref 0–1)
BILIRUB SERPL-MCNC: 0.29 MG/DL (ref 0.2–1)
BILIRUB SERPL-MCNC: 0.3 MG/DL (ref 0.2–1)
BUN SERPL-MCNC: 7 MG/DL (ref 5–25)
BUN SERPL-MCNC: 8 MG/DL (ref 5–25)
CALCIUM ALBUM COR SERPL-MCNC: 7.6 MG/DL (ref 8.3–10.1)
CALCIUM SERPL-MCNC: 7.1 MG/DL (ref 8.4–10.2)
CALCIUM SERPL-MCNC: 7.3 MG/DL (ref 8.4–10.2)
CHLORIDE SERPL-SCNC: 104 MMOL/L (ref 96–108)
CHLORIDE SERPL-SCNC: 104 MMOL/L (ref 96–108)
CO2 SERPL-SCNC: 21 MMOL/L (ref 21–32)
CO2 SERPL-SCNC: 22 MMOL/L (ref 21–32)
CREAT SERPL-MCNC: 0.66 MG/DL (ref 0.6–1.3)
CREAT SERPL-MCNC: 0.71 MG/DL (ref 0.6–1.3)
EOSINOPHIL # BLD AUTO: 0 THOUSAND/ÂΜL (ref 0–0.61)
EOSINOPHIL NFR BLD AUTO: 0 % (ref 0–6)
ERYTHROCYTE [DISTWIDTH] IN BLOOD BY AUTOMATED COUNT: 12.5 % (ref 11.6–15.1)
ERYTHROCYTE [DISTWIDTH] IN BLOOD BY AUTOMATED COUNT: 12.5 % (ref 11.6–15.1)
ERYTHROCYTE [DISTWIDTH] IN BLOOD BY AUTOMATED COUNT: 12.8 % (ref 11.6–15.1)
GFR SERPL CREATININE-BSD FRML MDRD: 117 ML/MIN/1.73SQ M
GFR SERPL CREATININE-BSD FRML MDRD: 122 ML/MIN/1.73SQ M
GLUCOSE SERPL-MCNC: 111 MG/DL (ref 65–140)
GLUCOSE SERPL-MCNC: 126 MG/DL (ref 65–140)
GP B STREP DNA SPEC QL NAA+PROBE: NEGATIVE
HCO3 BLDCOV-SCNC: 21.3 MMOL/L (ref 12.2–28.6)
HCT VFR BLD AUTO: 34.6 % (ref 34.8–46.1)
HCT VFR BLD AUTO: 37 % (ref 34.8–46.1)
HCT VFR BLD AUTO: 38.9 % (ref 34.8–46.1)
HGB BLD-MCNC: 11.7 G/DL (ref 11.5–15.4)
HGB BLD-MCNC: 12.5 G/DL (ref 11.5–15.4)
HGB BLD-MCNC: 13.4 G/DL (ref 11.5–15.4)
IMM GRANULOCYTES # BLD AUTO: 0.26 THOUSAND/UL (ref 0–0.2)
IMM GRANULOCYTES NFR BLD AUTO: 1 % (ref 0–2)
LYMPHOCYTES # BLD AUTO: 0.95 THOUSANDS/ÂΜL (ref 0.6–4.47)
LYMPHOCYTES NFR BLD AUTO: 4 % (ref 14–44)
MAGNESIUM SERPL-MCNC: 5.4 MG/DL (ref 1.9–2.7)
MAGNESIUM SERPL-MCNC: 5.6 MG/DL (ref 1.9–2.7)
MAGNESIUM SERPL-MCNC: 6.4 MG/DL (ref 1.9–2.7)
MCH RBC QN AUTO: 32 PG (ref 26.8–34.3)
MCH RBC QN AUTO: 32.5 PG (ref 26.8–34.3)
MCH RBC QN AUTO: 32.7 PG (ref 26.8–34.3)
MCHC RBC AUTO-ENTMCNC: 33.8 G/DL (ref 31.4–37.4)
MCHC RBC AUTO-ENTMCNC: 33.8 G/DL (ref 31.4–37.4)
MCHC RBC AUTO-ENTMCNC: 34.4 G/DL (ref 31.4–37.4)
MCV RBC AUTO: 95 FL (ref 82–98)
MCV RBC AUTO: 95 FL (ref 82–98)
MCV RBC AUTO: 96 FL (ref 82–98)
MONOCYTES # BLD AUTO: 2.13 THOUSAND/ÂΜL (ref 0.17–1.22)
MONOCYTES NFR BLD AUTO: 9 % (ref 4–12)
NEUTROPHILS # BLD AUTO: 19.63 THOUSANDS/ÂΜL (ref 1.85–7.62)
NEUTS SEG NFR BLD AUTO: 86 % (ref 43–75)
NRBC BLD AUTO-RTO: 0 /100 WBCS
OXYHGB MFR BLDCOV: 74.3 %
PCO2 BLDCOV: 32.5 MM HG (ref 27–43)
PH BLDCOV: 7.43 [PH] (ref 7.19–7.49)
PLATELET # BLD AUTO: 173 THOUSANDS/UL (ref 149–390)
PLATELET # BLD AUTO: 173 THOUSANDS/UL (ref 149–390)
PLATELET # BLD AUTO: 176 THOUSANDS/UL (ref 149–390)
PMV BLD AUTO: 11.1 FL (ref 8.9–12.7)
PMV BLD AUTO: 11.6 FL (ref 8.9–12.7)
PMV BLD AUTO: 11.8 FL (ref 8.9–12.7)
PO2 BLDCOV: 29.4 MM HG (ref 15–45)
POTASSIUM SERPL-SCNC: 3.8 MMOL/L (ref 3.5–5.3)
POTASSIUM SERPL-SCNC: 4 MMOL/L (ref 3.5–5.3)
PROT SERPL-MCNC: 6.9 G/DL (ref 6.4–8.4)
PROT SERPL-MCNC: 7 G/DL (ref 6.4–8.4)
RBC # BLD AUTO: 3.66 MILLION/UL (ref 3.81–5.12)
RBC # BLD AUTO: 3.85 MILLION/UL (ref 3.81–5.12)
RBC # BLD AUTO: 4.1 MILLION/UL (ref 3.81–5.12)
SAO2 % BLDCOV: 17.6 ML/DL
SODIUM SERPL-SCNC: 136 MMOL/L (ref 135–147)
SODIUM SERPL-SCNC: 136 MMOL/L (ref 135–147)
URATE SERPL-MCNC: 4.4 MG/DL (ref 2–7.5)
URATE SERPL-MCNC: 4.5 MG/DL (ref 2–7.5)
WBC # BLD AUTO: 12.82 THOUSAND/UL (ref 4.31–10.16)
WBC # BLD AUTO: 14.93 THOUSAND/UL (ref 4.31–10.16)
WBC # BLD AUTO: 23.02 THOUSAND/UL (ref 4.31–10.16)

## 2025-03-25 PROCEDURE — 80053 COMPREHEN METABOLIC PANEL: CPT | Performed by: OBSTETRICS & GYNECOLOGY

## 2025-03-25 PROCEDURE — 85025 COMPLETE CBC W/AUTO DIFF WBC: CPT | Performed by: OBSTETRICS & GYNECOLOGY

## 2025-03-25 PROCEDURE — 83735 ASSAY OF MAGNESIUM: CPT | Performed by: OBSTETRICS & GYNECOLOGY

## 2025-03-25 PROCEDURE — 84550 ASSAY OF BLOOD/URIC ACID: CPT | Performed by: OBSTETRICS & GYNECOLOGY

## 2025-03-25 PROCEDURE — 85027 COMPLETE CBC AUTOMATED: CPT | Performed by: OBSTETRICS & GYNECOLOGY

## 2025-03-25 PROCEDURE — 59400 OBSTETRICAL CARE: CPT | Performed by: OBSTETRICS & GYNECOLOGY

## 2025-03-25 PROCEDURE — 0HQ9XZZ REPAIR PERINEUM SKIN, EXTERNAL APPROACH: ICD-10-PCS | Performed by: OBSTETRICS & GYNECOLOGY

## 2025-03-25 PROCEDURE — 4A1HXCZ MONITORING OF PRODUCTS OF CONCEPTION, CARDIAC RATE, EXTERNAL APPROACH: ICD-10-PCS | Performed by: OBSTETRICS & GYNECOLOGY

## 2025-03-25 PROCEDURE — 10907ZC DRAINAGE OF AMNIOTIC FLUID, THERAPEUTIC FROM PRODUCTS OF CONCEPTION, VIA NATURAL OR ARTIFICIAL OPENING: ICD-10-PCS | Performed by: OBSTETRICS & GYNECOLOGY

## 2025-03-25 PROCEDURE — 88307 TISSUE EXAM BY PATHOLOGIST: CPT | Performed by: PATHOLOGY

## 2025-03-25 PROCEDURE — 82805 BLOOD GASES W/O2 SATURATION: CPT | Performed by: OBSTETRICS & GYNECOLOGY

## 2025-03-25 RX ORDER — BENZOCAINE/MENTHOL 6 MG-10 MG
1 LOZENGE MUCOUS MEMBRANE DAILY PRN
Status: DISCONTINUED | OUTPATIENT
Start: 2025-03-25 | End: 2025-03-27 | Stop reason: HOSPADM

## 2025-03-25 RX ORDER — CALCIUM CARBONATE 500 MG/1
1000 TABLET, CHEWABLE ORAL DAILY PRN
Status: DISCONTINUED | OUTPATIENT
Start: 2025-03-25 | End: 2025-03-27 | Stop reason: HOSPADM

## 2025-03-25 RX ORDER — CARBOPROST TROMETHAMINE 250 UG/ML
INJECTION, SOLUTION INTRAMUSCULAR
Status: COMPLETED
Start: 2025-03-25 | End: 2025-03-25

## 2025-03-25 RX ORDER — LIDOCAINE HYDROCHLORIDE AND EPINEPHRINE 15; 5 MG/ML; UG/ML
INJECTION, SOLUTION EPIDURAL AS NEEDED
Status: DISCONTINUED | OUTPATIENT
Start: 2025-03-25 | End: 2025-03-25 | Stop reason: HOSPADM

## 2025-03-25 RX ORDER — TRANEXAMIC ACID 10 MG/ML
1000 INJECTION, SOLUTION INTRAVENOUS ONCE
Status: COMPLETED | OUTPATIENT
Start: 2025-03-25 | End: 2025-03-25

## 2025-03-25 RX ORDER — ROPIVACAINE HYDROCHLORIDE 2 MG/ML
INJECTION, SOLUTION EPIDURAL; INFILTRATION; PERINEURAL AS NEEDED
Status: DISCONTINUED | OUTPATIENT
Start: 2025-03-25 | End: 2025-03-25 | Stop reason: HOSPADM

## 2025-03-25 RX ORDER — IBUPROFEN 600 MG/1
600 TABLET, FILM COATED ORAL EVERY 6 HOURS PRN
Status: DISCONTINUED | OUTPATIENT
Start: 2025-03-26 | End: 2025-03-27 | Stop reason: HOSPADM

## 2025-03-25 RX ORDER — LIDOCAINE HYDROCHLORIDE 10 MG/ML
INJECTION, SOLUTION EPIDURAL; INFILTRATION; INTRACAUDAL; PERINEURAL AS NEEDED
Status: DISCONTINUED | OUTPATIENT
Start: 2025-03-25 | End: 2025-03-25 | Stop reason: HOSPADM

## 2025-03-25 RX ORDER — OXYTOCIN/RINGER'S LACTATE 30/500 ML
1-30 PLASTIC BAG, INJECTION (ML) INTRAVENOUS
Status: DISCONTINUED | OUTPATIENT
Start: 2025-03-25 | End: 2025-03-25

## 2025-03-25 RX ORDER — IBUPROFEN 600 MG/1
600 TABLET, FILM COATED ORAL EVERY 6 HOURS PRN
Status: DISCONTINUED | OUTPATIENT
Start: 2025-03-25 | End: 2025-03-25

## 2025-03-25 RX ORDER — ONDANSETRON 2 MG/ML
4 INJECTION INTRAMUSCULAR; INTRAVENOUS EVERY 8 HOURS PRN
Status: DISCONTINUED | OUTPATIENT
Start: 2025-03-25 | End: 2025-03-27 | Stop reason: HOSPADM

## 2025-03-25 RX ORDER — KETOROLAC TROMETHAMINE 30 MG/ML
30 INJECTION, SOLUTION INTRAMUSCULAR; INTRAVENOUS ONCE
Status: COMPLETED | OUTPATIENT
Start: 2025-03-25 | End: 2025-03-25

## 2025-03-25 RX ORDER — DOCUSATE SODIUM 100 MG/1
100 CAPSULE, LIQUID FILLED ORAL 2 TIMES DAILY
Status: DISCONTINUED | OUTPATIENT
Start: 2025-03-25 | End: 2025-03-27 | Stop reason: HOSPADM

## 2025-03-25 RX ORDER — OXYTOCIN/RINGER'S LACTATE 30/500 ML
250 PLASTIC BAG, INJECTION (ML) INTRAVENOUS ONCE
Status: DISCONTINUED | OUTPATIENT
Start: 2025-03-25 | End: 2025-03-26

## 2025-03-25 RX ORDER — ACETAMINOPHEN 325 MG/1
650 TABLET ORAL EVERY 4 HOURS PRN
Status: DISCONTINUED | OUTPATIENT
Start: 2025-03-25 | End: 2025-03-27 | Stop reason: HOSPADM

## 2025-03-25 RX ORDER — CARBOPROST TROMETHAMINE 250 UG/ML
250 INJECTION, SOLUTION INTRAMUSCULAR ONCE
Status: COMPLETED | OUTPATIENT
Start: 2025-03-25 | End: 2025-03-25

## 2025-03-25 RX ORDER — OXYCODONE HYDROCHLORIDE 5 MG/1
5 TABLET ORAL
Status: DISCONTINUED | OUTPATIENT
Start: 2025-03-25 | End: 2025-03-27 | Stop reason: HOSPADM

## 2025-03-25 RX ORDER — METOCLOPRAMIDE HYDROCHLORIDE 5 MG/ML
10 INJECTION INTRAMUSCULAR; INTRAVENOUS EVERY 6 HOURS PRN
Status: DISCONTINUED | OUTPATIENT
Start: 2025-03-25 | End: 2025-03-27 | Stop reason: HOSPADM

## 2025-03-25 RX ADMIN — Medication 2 MILLI-UNITS/MIN: at 08:03

## 2025-03-25 RX ADMIN — METOCLOPRAMIDE 10 MG: 5 INJECTION, SOLUTION INTRAMUSCULAR; INTRAVENOUS at 20:02

## 2025-03-25 RX ADMIN — BENZOCAINE AND LEVOMENTHOL 1 APPLICATION: 200; 5 SPRAY TOPICAL at 21:16

## 2025-03-25 RX ADMIN — MAGNESIUM SULFATE HEPTAHYDRATE 2 G/HR: 40 INJECTION, SOLUTION INTRAVENOUS at 13:20

## 2025-03-25 RX ADMIN — SODIUM CHLORIDE 2.5 MILLION UNITS: 9 INJECTION, SOLUTION INTRAVENOUS at 08:49

## 2025-03-25 RX ADMIN — SODIUM CHLORIDE, SODIUM LACTATE, POTASSIUM CHLORIDE, AND CALCIUM CHLORIDE 75 ML/HR: .6; .31; .03; .02 INJECTION, SOLUTION INTRAVENOUS at 19:02

## 2025-03-25 RX ADMIN — DOCUSATE SODIUM 100 MG: 100 CAPSULE, LIQUID FILLED ORAL at 17:00

## 2025-03-25 RX ADMIN — ONDANSETRON 4 MG: 2 INJECTION, SOLUTION INTRAMUSCULAR; INTRAVENOUS at 14:49

## 2025-03-25 RX ADMIN — SODIUM CHLORIDE, SODIUM LACTATE, POTASSIUM CHLORIDE, AND CALCIUM CHLORIDE 125 ML/HR: .6; .31; .03; .02 INJECTION, SOLUTION INTRAVENOUS at 01:03

## 2025-03-25 RX ADMIN — LIDOCAINE HYDROCHLORIDE 4 ML: 10 INJECTION, SOLUTION EPIDURAL; INFILTRATION; INTRACAUDAL; PERINEURAL at 13:42

## 2025-03-25 RX ADMIN — DIPHENHYDRAMINE HYDROCHLORIDE 25 MG: 25 TABLET ORAL at 09:03

## 2025-03-25 RX ADMIN — WITCH HAZEL 1 PAD: 500 SOLUTION RECTAL; TOPICAL at 21:17

## 2025-03-25 RX ADMIN — SODIUM CHLORIDE, SODIUM LACTATE, POTASSIUM CHLORIDE, AND CALCIUM CHLORIDE 75 ML/HR: .6; .31; .03; .02 INJECTION, SOLUTION INTRAVENOUS at 17:47

## 2025-03-25 RX ADMIN — ROPIVACAINE HYDROCHLORIDE: 2 INJECTION, SOLUTION EPIDURAL; INFILTRATION at 14:00

## 2025-03-25 RX ADMIN — ROPIVACAINE HYDROCHLORIDE 5 ML: 2 INJECTION EPIDURAL; INFILTRATION; PERINEURAL at 13:53

## 2025-03-25 RX ADMIN — CARBOPROST TROMETHAMINE 250 MCG: 250 INJECTION, SOLUTION INTRAMUSCULAR at 19:41

## 2025-03-25 RX ADMIN — SODIUM CHLORIDE 5 MILLION UNITS: 0.9 INJECTION, SOLUTION INTRAVENOUS at 01:04

## 2025-03-25 RX ADMIN — LIDOCAINE HYDROCHLORIDE,EPINEPHRINE BITARTRATE 3 ML: 15; .005 INJECTION, SOLUTION EPIDURAL; INFILTRATION; INTRACAUDAL; PERINEURAL at 13:45

## 2025-03-25 RX ADMIN — METOCLOPRAMIDE 10 MG: 10 TABLET ORAL at 09:03

## 2025-03-25 RX ADMIN — SODIUM CHLORIDE 2.5 MILLION UNITS: 9 INJECTION, SOLUTION INTRAVENOUS at 16:56

## 2025-03-25 RX ADMIN — ACETAMINOPHEN 975 MG: 325 TABLET, FILM COATED ORAL at 09:03

## 2025-03-25 RX ADMIN — KETOROLAC TROMETHAMINE 30 MG: 30 INJECTION, SOLUTION INTRAMUSCULAR; INTRAVENOUS at 20:36

## 2025-03-25 RX ADMIN — PRENATAL VITAMINS-IRON FUMARATE 27 MG IRON-FOLIC ACID 0.8 MG TABLET 1 TABLET: at 08:49

## 2025-03-25 RX ADMIN — SODIUM CHLORIDE 2.5 MILLION UNITS: 9 INJECTION, SOLUTION INTRAVENOUS at 04:20

## 2025-03-25 RX ADMIN — TRANEXAMIC ACID 1000 MG: 10 INJECTION, SOLUTION INTRAVENOUS at 18:44

## 2025-03-25 RX ADMIN — DOCUSATE SODIUM 100 MG: 100 CAPSULE, LIQUID FILLED ORAL at 08:49

## 2025-03-25 RX ADMIN — ROPIVACAINE HYDROCHLORIDE 5 ML: 2 INJECTION EPIDURAL; INFILTRATION; PERINEURAL at 13:48

## 2025-03-25 RX ADMIN — MAGNESIUM SULFATE HEPTAHYDRATE 2 G/HR: 40 INJECTION, SOLUTION INTRAVENOUS at 04:20

## 2025-03-25 RX ADMIN — SODIUM CHLORIDE 2.5 MILLION UNITS: 9 INJECTION, SOLUTION INTRAVENOUS at 12:56

## 2025-03-25 NOTE — QUICK NOTE
Called to room by RN to examine bleeding.  Juan Manuel recently removed.  Bimanual exam done - about 50cc clots expressed.  Uterus firming up.  Will give hemabate 250mcg IM now.  Continue to monitor.    Janet Fay MD

## 2025-03-25 NOTE — PLAN OF CARE
Problem: ANTEPARTUM  Goal: Maintain pregnancy as long as maternal and/or fetal condition is stable  Description: INTERVENTIONS:- Maternal surveillance- Fetal surveillance- Monitor uterine activity- Medications as ordered- Bedrest  Outcome: Completed     Problem: Knowledge Deficit  Goal: Verbalizes understanding of labor plan  Description: Assess patient/family/caregiver's baseline knowledge level and ability to understand information.  Provide education via patient/family/caregiver's preferred learning method at appropriate level of understanding. 1. Provide teaching at level of understanding.2. Provide teaching via preferred learning method(s).  Outcome: Progressing     Problem: Labor & Delivery  Goal: Manages discomfort  Description: Assess and monitor for signs and symptoms of discomfort.  Assess patient's pain level regularly and per hospital policy.  Administer medications as ordered. Support use of nonpharmacological methods to help control pain such as distraction, imagery, relaxation, and application of heat and cold.  Collaborate with interdisciplinary team and patient to determine appropriate pain management plan.1. Include patient in decisions related to comfort.2. Offer non-pharmacological pain management interventions.3. Report ineffective pain management to physician.  Outcome: Progressing  Goal: Patient vital signs are stable  Description: 1. Assess vital signs - vaginal delivery.  Outcome: Progressing

## 2025-03-25 NOTE — OB LABOR/OXYTOCIN SAFETY PROGRESS
Labor Progress Note - Beatriz Sánchez 24 y.o. female MRN: [id number]    Unit/Bed#: -01 Encounter: [id number]    Dose (mayuri-units/min) Oxytocin: 4 mayuri-units/min  Contraction Frequency (minutes): occasional  Contraction Intensity: Mild  Uterine Activity Characteristics: Irregular    Cervical Dilation: 4  Cervical Effacement: 50  Fetal Station: -3    Baseline Rate (FHR): 115 bpm  Fetal Heart Rate (FHT): 140 BPM  FHR Category: 1                 Vital Signs:   Vitals:    25 0825   BP: 125/76   Pulse: 77   Resp:    Temp:    SpO2: 98%       Notes/comments:       Reviewed history with patient and partner - patient is  at 36.0wks who presented yesterday and was diagnosed with pre-eclampsia w/ severe features.  She was started on magnesium sulfate and IOL started.  She is now s/p cytotec x 1 and caballero balloon.  After balloon out patient wishes to see if went into labor.  Ctx spaced so pitocin started.  She is receiving PCN for GBS unknown.  Reviewed plan.  Will continue pitocin and AROM when able.  She is considering epidural, but unsure.    Plan reviewed and agreed to by patient and partner.    Janet Fay MD 3/25/2025 8:40 AM

## 2025-03-25 NOTE — OB LABOR/OXYTOCIN SAFETY PROGRESS
Labor Progress Note - Beatriz Sánchez 24 y.o. female MRN: [id number]    Unit/Bed#: -01 Encounter: [id number]       Contraction Frequency (minutes): 3-5  Contraction Intensity: Mild  Uterine Activity Characteristics: Irregular  Cervical Dilation: 1-2        Cervical Effacement: 60  Fetal Station: -3  Baseline Rate (FHR): 115 bpm  Fetal Heart Rate (FHT): 122 BPM  FHR Category: 1               Vital Signs:   Vitals:    25 1918   BP: 142/85   Pulse: 85   Resp:    Temp:    SpO2: 98%       Notes/comments:   Reviewed risks, benefits, and alternatives to caballero balloon.  Questions answered to the apparent satisfaction of Beatriz and Dakota.   They both verbalize understanding and Beatriz was agreeable to proceed with the caballero bulb  Placed without difficulty.  Beatriz tolerated procedure well.    Continued labor support  Beatriz declines need for medicinal pain management  Hopeful for CHESTER Mcduffie CNM 3/24/2025 8:04 PM

## 2025-03-25 NOTE — OB LABOR/OXYTOCIN SAFETY PROGRESS
Labor Progress Note - Beatriz Sánchez 24 y.o. female MRN: [id number]    Unit/Bed#: -01 Encounter: [id number]       Contraction Frequency (minutes): occasional  Contraction Intensity: Mild  Uterine Activity Characteristics: Irregular  Cervical Dilation: 1-2        Cervical Effacement: 60  Fetal Station: -3  Baseline Rate (FHR): 115 bpm  Fetal Heart Rate (FHT): 140 BPM  FHR Category: 1               Vital Signs:    Vitals:    03/25/25 0635   BP: 135/87   Pulse: 78   Resp: 16   Temp:    SpO2: 97%       Notes/comments:   - Balloon out last night, betsy on own but contractions spaced; patient wanted to rest and be rechecked after breakfast prior to restarting pitocin.  - Bps stable and asymptomatic      Antonella Multani MD 3/25/2025 7:08 AM

## 2025-03-25 NOTE — OB LABOR/OXYTOCIN SAFETY PROGRESS
Labor Progress Note - Beatriz Sánchez 24 y.o. female MRN: [id number]    Unit/Bed#: -01 Encounter: [id number]    Dose (mayuri-units/min) Oxytocin: 20 mayuri-units/min  Contraction Frequency (minutes): 3-5  Contraction Intensity: Mild  Uterine Activity Characteristics: Irregular    Cervical Dilation: 5  Cervical Effacement: 80  Fetal Station: -2    FHT baseline: 115bpm  Variability: moderate  Accels: present  Decels: Absent  Category 1        Vital Signs:   Vitals:    03/25/25 1305   BP: 136/76   Pulse: 76   Resp:    Temp:    SpO2: 100%       Notes/comments:      Patient getting more uncomfortable with contractions.  Some change.  Considering epidural.  Answered questions for patient and family re epidurals.  She is unsure at this time.  Continue pitocin.    Janet Fay MD 3/25/2025 1:06 PM

## 2025-03-25 NOTE — OB LABOR/OXYTOCIN SAFETY PROGRESS
Labor Progress Note - Beatriz Sánchez 24 y.o. female MRN: [id number]    Unit/Bed#: -01 Encounter: [id number]       Contraction Frequency (minutes): 2-4  Contraction Intensity: Mild  Uterine Activity Characteristics: Regular  Cervical Dilation: 1-2        Cervical Effacement: 60  Fetal Station: -3  Baseline Rate (FHR): 120 bpm  Fetal Heart Rate (FHT): 140 BPM  FHR Category: 1               Vital Signs:    Vitals:    25 2203   BP: 107/58   Pulse: 85   Resp: 16   Temp: 98.1 °F (36.7 °C)   SpO2: 98%       Notes/comments:   25 yo  at 36+0 IOL for PEC w/severe features    - BP stable, normal range now  - continue magnesium per protocol  - continue caballero balloon; can start pitocin once out      Antonella Multani MD 3/25/2025 12:05 AM

## 2025-03-25 NOTE — ANESTHESIA PROCEDURE NOTES
Epidural Block    Patient location during procedure: OB/L&D  Start time: 3/25/2025 1:45 PM  Reason for block: at surgeon's request and primary anesthetic  Staffing  Performed by: Ousmane Thomas MD  Authorized by: Ousmane Thomas MD    Preanesthetic Checklist  Completed: patient identified, IV checked, risks and benefits discussed, surgical consent, monitors and equipment checked, pre-op evaluation and timeout performed  Epidural  Patient position: sitting  Prep: ChloraPrep  Sedation Level: no sedation  Patient monitoring: frequent blood pressure checks and continuous pulse ox  Approach: midline  Location: lumbar, L3-4  Injection technique: GEORGE air  Needle  Needle type: Tuohy   Needle gauge: 17 G  Needle insertion depth: 4 cm  Catheter type: end hole  Catheter size: 19 G  Catheter at skin depth: 9 cm  Catheter securement method: stabilization device, clear occlusive dressing and tape  Test dose: negative  Assessment  Number of attempts: 1negative aspiration for CSF, negative aspiration for heme and no paresthesia on injection  patient tolerated the procedure well with no immediate complications

## 2025-03-25 NOTE — OB LABOR/OXYTOCIN SAFETY PROGRESS
Labor Progress Note - Beatriz Sánchez 24 y.o. female MRN: [id number]    Unit/Bed#: -01 Encounter: [id number]    Dose (mayuri-units/min) Oxytocin: 16 mayuri-units/min  Contraction Frequency (minutes): 2-3  Contraction Intensity: Mild/Moderate  Uterine Activity Characteristics: Regular    Cervical Dilation: 6  Cervical Effacement: 90  Fetal Station: -2    Baseline Rate (FHR): 110 bpm  Fetal Heart Rate (FHT): 140 BPM  FHR Category: 1               Vital Signs:   Vitals:    03/25/25 1500   BP: 122/74   Pulse: 75   Resp:    Temp: 97.7 °F (36.5 °C)   SpO2:        Notes/comments:     Comfortable s/p epidural.  Now entering active labor.  Continue pitocin.        Janet Fay MD 3/25/2025 3:23 PM

## 2025-03-25 NOTE — ANESTHESIA PREPROCEDURE EVALUATION
Procedure:  LABOR ANALGESIA    Relevant Problems   ANESTHESIA (within normal limits)      Obstetrics/Gynecology   (+) Gestational hypertension, third trimester     Labs:   Results from last 7 days   Lab Units 03/25/25  1036 03/25/25  0423 03/24/25 2159 03/24/25  1522 03/24/25  1304 03/24/25  0637 03/23/25  1709   WBC Thousand/uL 14.93* 12.82* 11.50* 12.26* 12.04* 9.96 8.18   HEMOGLOBIN g/dL 12.5 13.4 12.2 12.8 12.4 12.5 12.4   HEMATOCRIT % 37.0 38.9 35.9 37.6 36.5 37.0 36.3   PLATELETS Thousands/uL 173 176 166 169 155 147* 154     Results from last 7 days   Lab Units 03/25/25  1036 03/25/25  0423 03/24/25 2159 03/24/25  1522 03/24/25  1304 03/24/25  0637 03/23/25  1709   SODIUM mmol/L 136 136 135 135 136 136 137   POTASSIUM mmol/L 4.0 3.8 3.6 4.1 3.7 3.8 3.4*   CHLORIDE mmol/L 104 104 105 105 105 105 105   CO2 mmol/L 21 22 19* 22 22 21 24   ANION GAP mmol/L 11 10 11 8 9 10 8   BUN mg/dL 8 7 8 7 8 9 8   CREATININE mg/dL 0.71 0.66 0.74 0.62 0.61 0.64 0.70   EGFR ml/min/1.73sq m 117 122 112 124 125 123 119   CALCIUM mg/dL 7.1* 7.3* 7.3* 8.1* 8.4 8.8 9.2   GLUCOSE RANDOM mg/dL 111 126 162* 93 93 139 73   ALT U/L 25 24 21 17 16 16 13   AST U/L 33 28 26 25 21 20 18   ALK PHOS U/L 157* 166* 154* 165* 159* 134* 154*   ALBUMIN g/dL 3.4* 3.5 3.4* 3.5 3.4* 3.3* 3.5   TOTAL BILIRUBIN mg/dL 0.29 0.30 0.25 0.28 0.26 0.30 0.30     Results from last 7 days   Lab Units 03/25/25  1036 03/25/25  0423 03/24/25  2159 03/24/25  1522 03/24/25  1304   MAGNESIUM mg/dL 5.6* 5.4* 5.0* 4.4* 4.0*        Type and Screen:  Results from last 7 days   Lab Units 03/23/25  1737   ABO GROUPING  O   RH FACTOR  Positive   ANTIBODY SCREEN  Negative   SPECIMEN EXPIRATION DATE  [id number]        Physical Exam    Airway    Mallampati score: I  TM Distance: >3 FB  Neck ROM: full     Dental   No notable dental hx     Cardiovascular  Cardiovascular exam normal    Pulmonary  Pulmonary exam normal     Other Findings  post-pubertal.      Anesthesia Plan  ASA  Score- 2     Anesthesia Type- epidural with ASA Monitors.         Additional Monitors:     Airway Plan:     Comment: Patient is requesting epidural for labor.  Patient seen and examined.  The risks/benefits of Epidural anesthesia discussed including risk of PDPH, partial or failed block, and rare emergencies including infection, nerve injury and total spinal anesthesia.  Anesthetic plan for potential c/s in event of emergency reviewed with patient.  .       Plan Factors-    Chart reviewed.   Existing labs reviewed. Patient summary reviewed.                  Induction-     Postoperative Plan-         Informed Consent- Anesthetic plan and risks discussed with patient.        NPO Status:  No vitals data found for the desired time range.

## 2025-03-25 NOTE — OB LABOR/OXYTOCIN SAFETY PROGRESS
Labor Progress Note - Beatriz Sánchez 24 y.o. female MRN: [id number]    Unit/Bed#: -01 Encounter: [id number]    Dose (mayuri-units/min) Oxytocin: 16 mayuri-units/min  Contraction Frequency (minutes): 5-7  Contraction Intensity: Mild  Uterine Activity Characteristics: Irregular    Cervical Dilation: 4  Cervical Effacement: 70  Fetal Station: -2    FHT baseline: 110bpm  Variability: moderate  Accels: present  Decels: Absent  Category 1        Vital Signs:   Vitals:    03/25/25 1115   BP: 135/78   Pulse:    Resp:    Temp: 97.9 °F (36.6 °C)   SpO2:        Notes/comments:     AROM'd with fluid old blood tinged vs meconium.  Pt comfortable.  Continue pitocin.  Discussed possible meconium and will continue to evaluate fluid.  Discussed if meconium detected, then peds to delivery.  All questions answered.  Continue Magnesium Sulfate.  Pt feeling well.    Janet Fay MD 3/25/2025 11:23 AM

## 2025-03-25 NOTE — OB LABOR/OXYTOCIN SAFETY PROGRESS
Labor Progress Note - Beatriz Sánchez 24 y.o. female MRN: [id number]    Unit/Bed#: -01 Encounter: [id number]    Dose (mayuri-units/min) Oxytocin: 22 mayuri-units/min  Contraction Frequency (minutes): 2-4  Contraction Intensity: Mild/Moderate  Uterine Activity Characteristics: Irregular    Cervical Dilation: 9  Cervical Effacement: 90  Fetal Station: 0    FHT baseline: 115bpm  Variability: moderate  Accels: present  Decels: Absent  Category 1        Vital Signs:   Vitals:    03/25/25 1645   BP: 133/68   Pulse:    Resp:    Temp:    SpO2:        Notes/comments:     Comfortable with contractions but feeling them.  9cm - will sit up and continue pitocin.  Doing well on magnesium.  Repeat labs      Janet Fay MD 3/25/2025 5:05 PM

## 2025-03-25 NOTE — L&D DELIVERY NOTE
Vaginal Delivery Summary - OB/GYN   Beatriz Sánchez 24 y.o. female MRN: [id number]  Unit/Bed#: -01 Encounter: [id number]    Predelivery Diagnosis:  1. Pregnancy at 36w0d  2. Single fetus   3. Pre-eclampsia with severe features      Postdelivery Diagnosis:  1. Same as above - Delivered  2. 1st degree laceration    Procedure: Spontaneous Vaginal Delivery, repair of first laceration    Attending: Dr. Janet Fay    Anesthesia: Epidural    QBL: 535cc  Admission Hg:   Recent Labs     25  1036   HGB 12.5     Admission platelets:   Recent Labs     25  1036            Complications: none apparent    Specimens: cord blood, venous cord blood gasses, placenta to pathology    Findings:   1. Viable female at 18:29, with APGARS of 8 (1 min) and 9 (5 min),  2. Spontaneous delivery of intact placenta at 18:38  3. first degree laceration repaired with 3.0 vicryl  4. Blood gases:    Umbilical Cord Venous Blood Gas:  Results from last 7 days   Lab Units 25  1833   PH COV  7.434   PCO2 COV mm HG 32.5   HCO3 COV mmol/L 21.3   BASE EXC COV mmol/L -1.9*   O2 CT CD VB mL/dL 17.6   O2 HGB, VENOUS CORD % 74.3     Umbilical Cord Arterial Blood Gas:     Unable to collect    Disposition:  Patient tolerated the procedure well and was recovering in labor and delivery room     Brief History and Labor Course:    Beatriz Sánchez is a 24 y.o.  with an ALIDA of 2025, by Ultrasound at 36w0d gestation who was admitted for induction of labor for pre-eclampsia with severe features.    Please see labor timeline for complete labor course.     The patient was completely dilated at 18:08. She began to push at 18:09.     Description of procedure    After pushing for 20 minutes, at 18:29 patient delivered a viable female , wt pending, apgars of 8 (1 min) and 9 (5 min). The fetal vertex delivered spontaneously. The baby was palpated for a nuchal cord and none was palpated.  The anterior shoulder delivered  atraumatically with maternal expulsive forces and the assistance of downward traction. The posterior shoulder delivered with maternal expulsive forces and the assistance of upward traction. The remainder of the fetus delivered spontaneously.     Upon delivery, the infant was placed on the maternal abdomen and delayed cord clamping was performed.  The umbilical cord was then doubly clamped and cut.  The infant was noted to cry spontaneously and was moving all extremities appropriately. Venous cord blood gases and cord blood was collected for analysis. These were promptly sent to the lab. In the immediate post-partum, 30 units of IV pitocin was administered, and the uterus was noted to contract down well with massage and pitocin. The placenta delivered spontaneously at 18;38 and was noted to have a centrally inserted 3 vessel cord.     The vagina, cervix, perineum, and rectum were inspected and there was noted to be a 1st degree.  The laceration was repaired with 3.0 vicryl on a CT needle.  Good hemostasis was noted.     At the conclusion of the procedure, all needle, sponge, and instrument counts were noted to be correct. Patient tolerated the procedure well and was allowed to recover in labor and delivery room with family and  before being transferred to the post-partum floor.     Janet Fay MD

## 2025-03-25 NOTE — PLAN OF CARE
Problem: PAIN - ADULT  Goal: Verbalizes/displays adequate comfort level or baseline comfort level  Description: Interventions:- Encourage patient to monitor pain and request assistance- Assess pain using appropriate pain scale- Administer analgesics based on type and severity of pain and evaluate response- Implement non-pharmacological measures as appropriate and evaluate response- Consider cultural and social influences on pain and pain management- Notify physician/advanced practitioner if interventions unsuccessful or patient reports new pain  Outcome: Progressing     Problem: INFECTION - ADULT  Goal: Absence or prevention of progression during hospitalization  Description: INTERVENTIONS:- Assess and monitor for signs and symptoms of infection- Monitor lab/diagnostic results- Monitor all insertion sites, i.e. indwelling lines, tubes, and drains- Monitor endotracheal if appropriate and nasal secretions for changes in amount and color- [place] appropriate cooling/warming therapies per order- Administer medications as ordered- Instruct and encourage patient and family to use good hand hygiene technique- Identify and instruct in appropriate isolation precautions for identified infection/condition  Outcome: Progressing  Goal: Absence of fever/infection during neutropenic period  Description: INTERVENTIONS:- Monitor WBC  Outcome: Progressing     Problem: SAFETY ADULT  Goal: Patient will remain free of falls  Description: INTERVENTIONS:- Educate patient/family on patient safety including physical limitations- Instruct patient to call for assistance with activity - Consult OT/PT to assist with strengthening/mobility - Keep Call bell within reach- Keep bed low and locked with side rails adjusted as appropriate- Keep care items and personal belongings within reach- Initiate and maintain comfort rounds- Make Fall Risk Sign visible to staff-  Apply yellow socks and bracelet for high fall risk patients- Consider moving  patient to room near nurses station  Outcome: Progressing  Goal: Maintain or return to baseline ADL function  Description: INTERVENTIONS:-  Assess patient's ability to carry out ADLs; assess patient's baseline for ADL function and identify physical deficits which impact ability to perform ADLs (bathing, care of mouth/teeth, toileting, grooming, dressing, etc.)- Assess/evaluate cause of self-care deficits - Assess range of motion- Assess patient's mobility; develop plan if impaired- Assess patient's need for assistive devices and provide as appropriate- Encourage maximum independence but intervene and supervise when necessary- Involve family in performance of ADLs- Assess for home care needs following discharge - Consider OT consult to assist with ADL evaluation and planning for discharge- Provide patient education as appropriate  Outcome: Progressing  Goal: Maintains/Returns to pre admission functional level  Description: INTERVENTIONS:- Perform AM-PAC 6 Click Basic Mobility/ Daily Activity assessment daily.- Set and communicate daily mobility goal to care team and patient/family/caregiver. - Collaborate with rehabilitation services on mobility goals if consulted-   Outcome: Progressing     Problem: Knowledge Deficit  Goal: Patient/family/caregiver demonstrates understanding of disease process, treatment plan, medications, and discharge instructions  Description: Complete learning assessment and assess knowledge base.Interventions:- Provide teaching at level of understanding- Provide teaching via preferred learning methods  Outcome: Progressing     Problem: DISCHARGE PLANNING  Goal: Discharge to home or other facility with appropriate resources  Description: INTERVENTIONS:- Identify barriers to discharge w/patient and caregiver- Arrange for needed discharge resources and transportation as appropriate- Identify discharge learning needs (meds, wound care, etc.)- Arrange for interpretive services to assist at discharge  as needed- Refer to Case Management Department for coordinating discharge planning if the patient needs post-hospital services based on physician/advanced practitioner order or complex needs related to functional status, cognitive ability, or social support system  Outcome: Progressing     Problem: POSTPARTUM  Goal: Experiences normal postpartum course  Description: INTERVENTIONS:- Monitor maternal vital signs- Assess uterine involution and lochia  Outcome: Progressing  Goal: Appropriate maternal -  bonding  Description: INTERVENTIONS:- Identify family support- Assess for appropriate maternal/infant bonding -Encourage maternal/infant bonding opportunities- Referral to  or  as needed  Outcome: Progressing  Goal: Establishment of infant feeding pattern  Description: INTERVENTIONS:- Assess breast/bottle feeding- Refer to lactation as needed  Outcome: Progressing  Goal: Incision(s), wounds(s) or drain site(s) healing without S/S of infection  Description: INTERVENTIONS- Assess and document dressing, incision, wound bed, drain sites and surrounding tissue- Provide patient and family education- Perform skin care/dressing changes every   Outcome: Progressing

## 2025-03-25 NOTE — PROGRESS NOTES
Strip review      V/s 97.8  139/90, 80, 18  FHTs 125 bpm, + accels, - decels, good variability  Ucs q 2-3 minutes apart, mild   Cx deferred  Membranes intact

## 2025-03-26 PROBLEM — Z3A.35 35 WEEKS GESTATION OF PREGNANCY: Status: RESOLVED | Noted: 2024-10-21 | Resolved: 2025-03-26

## 2025-03-26 PROBLEM — O13.3 GESTATIONAL HYPERTENSION, THIRD TRIMESTER: Status: RESOLVED | Noted: 2025-03-23 | Resolved: 2025-03-26

## 2025-03-26 LAB
ALBUMIN SERPL BCG-MCNC: 3 G/DL (ref 3.5–5)
ALP SERPL-CCNC: 121 U/L (ref 34–104)
ALT SERPL W P-5'-P-CCNC: 22 U/L (ref 7–52)
ANION GAP SERPL CALCULATED.3IONS-SCNC: 6 MMOL/L (ref 4–13)
AST SERPL W P-5'-P-CCNC: 29 U/L (ref 13–39)
BASOPHILS # BLD AUTO: 0.01 THOUSANDS/ÂΜL (ref 0–0.1)
BASOPHILS NFR BLD AUTO: 0 % (ref 0–1)
BILIRUB SERPL-MCNC: 0.27 MG/DL (ref 0.2–1)
BUN SERPL-MCNC: 10 MG/DL (ref 5–25)
CALCIUM ALBUM COR SERPL-MCNC: 8.2 MG/DL (ref 8.3–10.1)
CALCIUM SERPL-MCNC: 7.4 MG/DL (ref 8.4–10.2)
CHLORIDE SERPL-SCNC: 106 MMOL/L (ref 96–108)
CO2 SERPL-SCNC: 25 MMOL/L (ref 21–32)
CREAT SERPL-MCNC: 0.73 MG/DL (ref 0.6–1.3)
EOSINOPHIL # BLD AUTO: 0 THOUSAND/ÂΜL (ref 0–0.61)
EOSINOPHIL NFR BLD AUTO: 0 % (ref 0–6)
ERYTHROCYTE [DISTWIDTH] IN BLOOD BY AUTOMATED COUNT: 12.8 % (ref 11.6–15.1)
GFR SERPL CREATININE-BSD FRML MDRD: 114 ML/MIN/1.73SQ M
GLUCOSE SERPL-MCNC: 83 MG/DL (ref 65–140)
HCT VFR BLD AUTO: 31.1 % (ref 34.8–46.1)
HGB BLD-MCNC: 10.4 G/DL (ref 11.5–15.4)
IMM GRANULOCYTES # BLD AUTO: 0.13 THOUSAND/UL (ref 0–0.2)
IMM GRANULOCYTES NFR BLD AUTO: 1 % (ref 0–2)
LYMPHOCYTES # BLD AUTO: 1.25 THOUSANDS/ÂΜL (ref 0.6–4.47)
LYMPHOCYTES NFR BLD AUTO: 8 % (ref 14–44)
MAGNESIUM SERPL-MCNC: 4.5 MG/DL (ref 1.9–2.7)
MCH RBC QN AUTO: 32 PG (ref 26.8–34.3)
MCHC RBC AUTO-ENTMCNC: 33.4 G/DL (ref 31.4–37.4)
MCV RBC AUTO: 96 FL (ref 82–98)
MONOCYTES # BLD AUTO: 0.98 THOUSAND/ÂΜL (ref 0.17–1.22)
MONOCYTES NFR BLD AUTO: 7 % (ref 4–12)
NEUTROPHILS # BLD AUTO: 12.58 THOUSANDS/ÂΜL (ref 1.85–7.62)
NEUTS SEG NFR BLD AUTO: 84 % (ref 43–75)
NRBC BLD AUTO-RTO: 0 /100 WBCS
PLATELET # BLD AUTO: 172 THOUSANDS/UL (ref 149–390)
PMV BLD AUTO: 11 FL (ref 8.9–12.7)
POTASSIUM SERPL-SCNC: 4.2 MMOL/L (ref 3.5–5.3)
PROT SERPL-MCNC: 5.8 G/DL (ref 6.4–8.4)
RBC # BLD AUTO: 3.25 MILLION/UL (ref 3.81–5.12)
SODIUM SERPL-SCNC: 137 MMOL/L (ref 135–147)
WBC # BLD AUTO: 14.95 THOUSAND/UL (ref 4.31–10.16)

## 2025-03-26 PROCEDURE — 80053 COMPREHEN METABOLIC PANEL: CPT | Performed by: OBSTETRICS & GYNECOLOGY

## 2025-03-26 PROCEDURE — 83735 ASSAY OF MAGNESIUM: CPT | Performed by: OBSTETRICS & GYNECOLOGY

## 2025-03-26 PROCEDURE — 85025 COMPLETE CBC W/AUTO DIFF WBC: CPT | Performed by: OBSTETRICS & GYNECOLOGY

## 2025-03-26 RX ORDER — NIFEDIPINE 30 MG/1
30 TABLET, EXTENDED RELEASE ORAL DAILY
Status: DISCONTINUED | OUTPATIENT
Start: 2025-03-26 | End: 2025-03-27

## 2025-03-26 RX ORDER — FUROSEMIDE 20 MG/1
20 TABLET ORAL DAILY
Status: DISCONTINUED | OUTPATIENT
Start: 2025-03-26 | End: 2025-03-27 | Stop reason: HOSPADM

## 2025-03-26 RX ADMIN — PRENATAL VITAMINS-IRON FUMARATE 27 MG IRON-FOLIC ACID 0.8 MG TABLET 1 TABLET: at 08:08

## 2025-03-26 RX ADMIN — MAGNESIUM SULFATE HEPTAHYDRATE 1 G/HR: 40 INJECTION, SOLUTION INTRAVENOUS at 00:25

## 2025-03-26 RX ADMIN — IBUPROFEN 600 MG: 600 TABLET ORAL at 08:08

## 2025-03-26 RX ADMIN — IBUPROFEN 600 MG: 600 TABLET ORAL at 02:38

## 2025-03-26 RX ADMIN — NIFEDIPINE 30 MG: 30 TABLET, FILM COATED, EXTENDED RELEASE ORAL at 10:50

## 2025-03-26 RX ADMIN — WITCH HAZEL 1 PAD: 500 SOLUTION RECTAL; TOPICAL at 00:26

## 2025-03-26 RX ADMIN — BENZOCAINE AND LEVOMENTHOL 1 APPLICATION: 200; 5 SPRAY TOPICAL at 00:26

## 2025-03-26 RX ADMIN — FUROSEMIDE 20 MG: 20 TABLET ORAL at 10:52

## 2025-03-26 RX ADMIN — IBUPROFEN 600 MG: 600 TABLET ORAL at 21:41

## 2025-03-26 RX ADMIN — DOCUSATE SODIUM 100 MG: 100 CAPSULE, LIQUID FILLED ORAL at 17:57

## 2025-03-26 RX ADMIN — DOCUSATE SODIUM 100 MG: 100 CAPSULE, LIQUID FILLED ORAL at 08:08

## 2025-03-26 NOTE — PLAN OF CARE
Problem: PAIN - ADULT  Goal: Verbalizes/displays adequate comfort level or baseline comfort level  Description: Interventions:- Encourage patient to monitor pain and request assistance- Assess pain using appropriate pain scale- Administer analgesics based on type and severity of pain and evaluate response- Implement non-pharmacological measures as appropriate and evaluate response- Consider cultural and social influences on pain and pain management- Notify physician/advanced practitioner if interventions unsuccessful or patient reports new pain  Outcome: Progressing     Problem: INFECTION - ADULT  Goal: Absence or prevention of progression during hospitalization  Description: INTERVENTIONS:- Assess and monitor for signs and symptoms of infection- Monitor lab/diagnostic results- Monitor all insertion sites, i.e. indwelling lines, tubes, and drains- Monitor endotracheal if appropriate and nasal secretions for changes in amount and color- [place] appropriate cooling/warming therapies per order- Administer medications as ordered- Instruct and encourage patient and family to use good hand hygiene technique- Identify and instruct in appropriate isolation precautions for identified infection/condition  Outcome: Progressing  Goal: Absence of fever/infection during neutropenic period  Description: INTERVENTIONS:- Monitor WBC  Outcome: Progressing     Problem: SAFETY ADULT  Goal: Patient will remain free of falls  Description: INTERVENTIONS:- Educate patient/family on patient safety including physical limitations- Instruct patient to call for assistance with activity - Consult OT/PT to assist with strengthening/mobility - Keep Call bell within reach- Keep bed low and locked with side rails adjusted as appropriate- Keep care items and personal belongings within reach- Initiate and maintain comfort rounds- Make Fall Risk Sign visible to staff- Offer Toileting every  Hours, in advance of need- Initiate/Maintain alarm- Obtain  necessary fall risk management equipment: - Apply yellow socks and bracelet for high fall risk patients- Consider moving patient to room near nurses station  Outcome: Progressing  Goal: Maintain or return to baseline ADL function  Description: INTERVENTIONS:-  Assess patient's ability to carry out ADLs; assess patient's baseline for ADL function and identify physical deficits which impact ability to perform ADLs (bathing, care of mouth/teeth, toileting, grooming, dressing, etc.)- Assess/evaluate cause of self-care deficits - Assess range of motion- Assess patient's mobility; develop plan if impaired- Assess patient's need for assistive devices and provide as appropriate- Encourage maximum independence but intervene and supervise when necessary- Involve family in performance of ADLs- Assess for home care needs following discharge - Consider OT consult to assist with ADL evaluation and planning for discharge- Provide patient education as appropriate  Outcome: Progressing  Goal: Maintains/Returns to pre admission functional level  Description: INTERVENTIONS:- Perform AM-PAC 6 Click Basic Mobility/ Daily Activity assessment daily.- Set and communicate daily mobility goal to care team and patient/family/caregiver. - Collaborate with rehabilitation services on mobility goals if consulted- Perform Range of Motion  times a day.- Reposition patient every  hours.- Dangle patient  times a day- Stand patient  times a day- Ambulate patient  times a day- Out of bed to chair  times a day - Out of bed for meals  times a day- Out of bed for toileting- Record patient progress and toleration of activity level   Outcome: Progressing     Problem: Knowledge Deficit  Goal: Patient/family/caregiver demonstrates understanding of disease process, treatment plan, medications, and discharge instructions  Description: Complete learning assessment and assess knowledge base.Interventions:- Provide teaching at level of understanding- Provide  teaching via preferred learning methods  Outcome: Progressing     Problem: DISCHARGE PLANNING  Goal: Discharge to home or other facility with appropriate resources  Description: INTERVENTIONS:- Identify barriers to discharge w/patient and caregiver- Arrange for needed discharge resources and transportation as appropriate- Identify discharge learning needs (meds, wound care, etc.)- Arrange for interpretive services to assist at discharge as needed- Refer to Case Management Department for coordinating discharge planning if the patient needs post-hospital services based on physician/advanced practitioner order or complex needs related to functional status, cognitive ability, or social support system  Outcome: Progressing     Problem: POSTPARTUM  Goal: Experiences normal postpartum course  Description: INTERVENTIONS:- Monitor maternal vital signs- Assess uterine involution and lochia  Outcome: Progressing  Goal: Appropriate maternal -  bonding  Description: INTERVENTIONS:- Identify family support- Assess for appropriate maternal/infant bonding -Encourage maternal/infant bonding opportunities- Referral to  or  as needed  Outcome: Progressing  Goal: Establishment of infant feeding pattern  Description: INTERVENTIONS:- Assess breast/bottle feeding- Refer to lactation as needed  Outcome: Progressing  Goal: Incision(s), wounds(s) or drain site(s) healing without S/S of infection  Description: INTERVENTIONS- Assess and document dressing, incision, wound bed, drain sites and surrounding tissue- Provide patient and family education- Perform skin care/dressing changes every   Outcome: Progressing

## 2025-03-26 NOTE — LACTATION NOTE
This note was copied from a baby's chart.  CONSULT - LACTATION  Baby Girl Sánchez (Kayla) 1 days female MRN: [id number]    [facility] NURSERY Room / Bed: (N)/(N) Encounter: [id number]    Maternal Information     MOTHER:  Beatriz Sánchez  Maternal Age: 24 y.o.  OB History: # 1 - Date: 25, Sex: Female, Weight: 2353 g (5 lb 3 oz), GA: 36w0d, Type: Vaginal, Spontaneous, Apgar1: 8, Apgar5: 9, Living: Living, Birth Comments: None   Previous breast reduction surgery? No    Lactation history:   Has patient previously breast fed: No   How long had patient previously breast fed:     Previous breast feeding complications:       Past Surgical History:   Procedure Laterality Date    WISDOM TOOTH EXTRACTION         Birth information:  YOB: 2027   Time of birth: 6:29 PM   Sex: female   Delivery type: Vaginal, Spontaneous   Birth Weight: 2353 g (5 lb 3 oz)   Percent of Weight Change: 1%     Gestational Age: 36w0d   [unfilled]    Reason for Consult:    Reason for Consult: Initial assessment (routine) - 10 min, Latch Assess (routine) - 10 min, Pump Follow Up - 5 min, Supplementation (routine) - 10 min, High Risk Infant - 10 min    Risk Factors:    Risk Factors: LPI    Breast and nipple assessment:   Breasts/Nipples  Left Breast: Soft  Right Breast: Soft  Left Nipple: Flat, Everted  Right Nipple: Flat, Everted  Intervention: Breast pump, Hand expression, Other (comment) (Latch Assist)  Breastfeeding Status: Yes  Breastfeeding Progress: Not yet established, Other issues (comment) (LPI Baby)    OB Lactation Tools:    Other OB Lactation Tools  Feeding Devices: Bottle, Pump    Breast Pump:    Breast Pump  Pump: Manual, Electric, Personal  Pump Review/Education: Setup, frequency, and cleaning  Initiated by: Nursing Staff  Date Initiated: 25       Assessment: sleepy, unable to fully assess baby's mouth. Baby is sleepy and not lifting tongue, gloved  finger in baby's mouth revealed a good suck, there does appear to be a possible lip tie, unable to assess under tongue. Baby is bringing tongue to gumline but not extending tongue out to lips with this assessment.    Feeding assessment: no latch, sleepy baby. Attempted at breast for 5 minutes before supplementation offered. Baby is receiving neosure r/t LPI and Low Blood Sugars.  LATCH:  Latch: Too sleepy or reluctant, no latch achieved   Audible Swallowing: None   Type of Nipple: Everted (After stimulation)   Comfort (Breast/Nipple): Soft/non-tender   Hold (Positioning): Partial assist, teach one side, mother does other, staff holds   LATCH Score: 5         Feeding recommendations:   Introduce baby to the breast for 5 minutes on each side before supplementing. Feed baby expressed breast milk and top baby off with formula as needed /ordered. Pump after feedings until milk supply is established and baby is breastfeeding well.    Met with parents this afternoon. Beatriz's feeding intent is to breastfeed her baby girl.     Information given and discussed on breastfeeding a late  infant.  Discussed sleepiness, maintaining body temperature, the lack of stamina necessitating shorter feedings. Encouraged feeding every 2-3 hours around the clock followed by hand expressing/pumping.    Baby is receiving Neosure for low blood sugars. Mom is pace bottle feeding her baby. Mom states that she was instructed by neonatology to give baby 20 ml of formula.  Baby has been taking 15-18 ml.at a feeding.     The Ready Set Baby and the Discharge Breastfeeding Booklets were also provided and contents briefly reviewed.  Encouraged mom to do skin to skin in between feedings, as this will help regulate baby's temperature and blood sugar, as well as facilitating bonding  and increasing confidence and reducing stress and anxiety.    All parents questions were answered. Encouraged Beatriz to call with additional questions and for  breastfeeding support as needed.      Shanti Clarke, RN 3/26/2025 1:03 PM

## 2025-03-26 NOTE — ASSESSMENT & PLAN NOTE
- s/p magnesium for > 12 hours postpartum. Patient feeling unwell on magnesium and given need to drop magnesium rate overnight, her normal labwork this morning and overall clinical picture, can consider discontinuing magnesium now that 12 hours have been given postpartum. Her Bps are mildly elevated so will initiate Procardia XL 30 mg daily  - monitor BP today and possible need to titrate dosing of Procardia

## 2025-03-26 NOTE — QUICK NOTE
"Beatriz feels 100 times better after holding magnesium.  \"I feel normal\".    Discussed that level was 6.4, which was highest we've seen for her since started but not supratherapeutic.  Given she has increase risk of seizure still for 24 hours after delivery, I recommend we restart magnesium sulfate IV at 2gr/hour, no bolus dose.  Beatriz is very hesitant to consider restarting, because she felt so badly.  Reviewed with her and partner seizure risk persists even with normal BP and normal labs - this is why we recommend for 24 hours.  I'm hopeful since she already feels better, even if restarted she will continue to feel okay.    She wants to think about it.  I recommend restarting at midnight and continuing until 18:30 tomorrow.  She asked about 1gr/hour.  Discussed that could get her to therapeutic level which is 4.8-8.4 but low enough to avoid side effects, but still risk of seizure.  Her and  will consider if they are comfortable restarting at 1gm/hr or 2gr/hr.  I encouraged them to restart and we can always stop again if she feels symptomatic.  She was on magnesium at 2gr/hr for a long time before she felt symptoms from it.    They express understanding of risk of seizures.  They will consider and let us know.    Janet Fay MD    "

## 2025-03-26 NOTE — UTILIZATION REVIEW
"    NOTIFICATION OF INPATIENT ADMISSION   MATERNITY/DELIVERY AUTHORIZATION REQUEST   SERVICING FACILITY:   [facility]  Parent Child Health - L&D, , NICU  [address] [place] [address]  Tax ID: [id number]  NPI: [id number] ATTENDING PROVIDER:  Attending Name and NPI#: Janet Fay Md [[id number]]  [facility] [place] [address]  Phone: [phone]     ADMISSION INFORMATION:  Place of Service: Inpatient [facility]  Place of Service Code: 21  Inpatient Admission Date/Time: 3/24/25  4:18 PM  Discharge Date/Time: No discharge date for patient encounter.  Admitting Diagnosis Code/Description:  No admission diagnoses are documented for this encounter.     Mother: Beatriz Sánchez 2000 Estimated Date of Delivery: 25  Delivering clinician: Janet Fay   OB History          1    Para   1    Term   0       1    AB   0    Living   1         SAB   0    IAB   0    Ectopic   0    Multiple   0    Live Births   1           Obstetric Comments   Menarche  at  10   ,  28-30    wearing tampons   chg  every   8 hours  ,  not up at night   to chg  cramps    not terrible  mild to mod   Started   birth control at  age    14 for contraception    hx of   HMB as a teen  and cramps                Name & MRN:   Information for the patient's :  Gonzalo, Baby Girl (Beatriz) [[id number]]   [place] Delivery Information:  Sex: female  Delivered 3/25/2025 6:29 PM by Vaginal, Spontaneous; Gestational Age: 36w0d    [place] Measurements:  Weight: 5 lb 3 oz (2353 g);  Height: 18.75\"    APGAR 1 minute 5 minutes 10 minutes   Totals: 8 9       UTILIZATION REVIEW CONTACT:  Jenise Goldstein, Utilization   Network Utilization Review Department  Phone: [phone]  Fax [phone]  Email: [email or web address]  Contact for approvals/pending authorizations, clinical reviews, and discharge.     PHYSICIAN ADVISORY SERVICES:  Medical " Necessity Denial & Xymm-rd-Ucjq Review  Phone: [phone]  Fax: [phone]  Email: [email or web address]     DISCHARGE SUPPORT TEAM:  For Patients Discharge Needs & Updates  Phone: [phone] opt. 2 Fax: [phone]  Email: [email or web address]

## 2025-03-26 NOTE — QUICK NOTE
Patient not feeling well, c/o double vision and nausea.  Labs checked  Mag trend:  4.0 > 4.4 > 5.0 > 5.4 > 5.6 > 6.4   Current 6.4 - will hold magnesium due to symptoms and once patient feeling better restart.  She has received Reglan and Zofran for nausea.   BP normal.    Janet Fay MD

## 2025-03-26 NOTE — ANESTHESIA POSTPROCEDURE EVALUATION
Post-Op Assessment Note    CV Status:  Stable  Pain Score: 0    Pain management: adequate      Post-op block assessment: catheter intact and no complications   Mental Status:  Alert and awake   Hydration Status:  Euvolemic   PONV Controlled:  Controlled   Airway Patency:  Patent     Post Op Vitals Reviewed: Yes    No anethesia notable event occurred.    Staff: CRNA           Last Filed PACU Vitals:  Vitals Value Taken Time   Temp     Pulse 77 03/25/25 2019   /70 03/25/25 2008   Resp     SpO2 97 % 03/25/25 2019   Vitals shown include unfiled device data.

## 2025-03-26 NOTE — PROGRESS NOTES
Progress Note - OB/GYN  Post-Partum Physician Note   Beatriz Sánchez 24 y.o. female MRN: [id number]  Unit/Bed#:  208-01 Encounter: [id number]    Assessment:  24 y.o. year-old , postpartum day #1 s/p  following IOL at 35+6 weeks for preeclampsia with severe features.    Assessment & Plan  Normal vaginal delivery  - routine postpartum care  - baby girl 2353g with Apgars 8, 9  - Hgb 11.7 > 10.4  - breastfeeding  Pre-eclampsia in third trimester  - s/p magnesium for > 12 hours postpartum. Patient feeling unwell on magnesium and given need to drop magnesium rate overnight, her normal labwork this morning and overall clinical picture, can consider discontinuing magnesium now that 12 hours have been given postpartum. Her Bps are mildly elevated so will initiate Procardia XL 30 mg daily  - monitor BP today and possible need to titrate dosing of Procardia      _________________________________    Subjective:   No acute events overnight. Did have side effects form magnesium which were difficult for her so magnesium was dropped to 1 g/hr. Denies HA, visual changes, epigastric pain. Denies shortness of breath or chest pain. Ambulating and voiding without difficulty.  Good  urine output. Minimal lochia.      Objective:   Vitals:    25 0430 25 0700 25 1030 25 1100   BP: 126/81 147/72 139/83 134/64   BP Location: Right arm Right arm Right arm Left arm   Pulse: 64 61 59 70   Resp: 18 18 20 18   Temp: 97.7 °F (36.5 °C) 97.6 °F (36.4 °C) 97.7 °F (36.5 °C) 98.1 °F (36.7 °C)   TempSrc: Oral Temporal Oral Temporal   SpO2:  98% 97% 98%   Weight:       Height:           Intake/Output Summary (Last 24 hours) at 3/26/2025 1148  Last data filed at 3/26/2025 0030  Gross per 24 hour   Intake 3314.64 ml   Output 1835 ml   Net 1479.64 ml       Physical Exam:  General: AOx3, NAD  Lungs: CTAB  CV: RRR  Abdomen: soft, fundus firm below umbilicus, nontender  Extremities: nontender with + 2 pedal edema  "b/l      Labs/Tests:   Lab Results   Component Value Date/Time    HGB 10.4 (L) 03/26/2025 07:55 AM    HGB 11.7 03/25/2025 08:14 PM    HGB 12.5 03/25/2025 10:36 AM    HGB 13.4 03/25/2025 04:23 AM    HGB 12.2 03/24/2025 09:59 PM     03/26/2025 07:55 AM     03/25/2025 08:14 PM     03/25/2025 10:36 AM     03/25/2025 04:23 AM     03/24/2025 09:59 PM    WBC 14.95 (H) 03/26/2025 07:55 AM    WBC 23.02 (H) 03/25/2025 08:14 PM    WBC 14.93 (H) 03/25/2025 10:36 AM    WBC 12.82 (H) 03/25/2025 04:23 AM    WBC 11.50 (H) 03/24/2025 09:59 PM    CREATININE 0.73 03/26/2025 07:55 AM    CREATININE 0.71 03/25/2025 10:36 AM    CREATININE 0.66 03/25/2025 04:23 AM    CREATININE 0.74 03/24/2025 09:59 PM    CREATININE 0.62 03/24/2025 03:22 PM    ALT 22 03/26/2025 07:55 AM    ALT 25 03/25/2025 10:36 AM    ALT 24 03/25/2025 04:23 AM    ALT 21 03/24/2025 09:59 PM    ALT 17 03/24/2025 03:22 PM    AST 29 03/26/2025 07:55 AM    AST 33 03/25/2025 10:36 AM    AST 28 03/25/2025 04:23 AM    AST 26 03/24/2025 09:59 PM    AST 25 03/24/2025 03:22 PM    URICACID 4.5 03/25/2025 10:36 AM    URICACID 4.4 03/25/2025 04:23 AM    URICACID 4.9 03/24/2025 09:59 PM    URICACID 4.3 03/24/2025 03:22 PM    URICACID 4.5 03/24/2025 01:04 PM    UTPCR 0.2 (H) 03/24/2025 02:03 PM    UTPCR 0.2 (H) 03/23/2025 05:09 PM        Brief OB Lab review:  ABO Grouping   Date Value Ref Range Status   03/23/2025 O  Final      Rh Factor   Date Value Ref Range Status   03/23/2025 Positive  Final     Rh Type   Date Value Ref Range Status   10/11/2024 Positive  Final     Comment:     Please note: Prior records for this patient's ABO / Rh type are not  available for additional verification.      No results found for: \"ANTIBODYSCR\"  No results found for: \"RUBM\"    MEDS:     Current Facility-Administered Medications:     acetaminophen (TYLENOL) tablet 650 mg, Q4H PRN    benzocaine-menthol-lanolin-aloe (DERMOPLAST) 20-0.5 % topical spray 1 Application, Q6H " PRN    calcium carbonate (TUMS) chewable tablet 1,000 mg, Daily PRN    diphenhydrAMINE (BENADRYL) tablet 25 mg, Q6H PRN    docusate sodium (COLACE) capsule 100 mg, BID    famotidine (PEPCID) tablet 20 mg, Q12H PRN    furosemide (LASIX) tablet 20 mg, Daily    hydrocortisone 1 % cream 1 Application, Daily PRN    hydrOXYzine HCL (ATARAX) tablet 25 mg, HS PRN    ibuprofen (MOTRIN) tablet 600 mg, Q6H PRN    metoclopramide (REGLAN) injection 10 mg, Q6H PRN    NIFEdipine (PROCARDIA XL) 24 hr tablet 30 mg, Daily    ondansetron (ZOFRAN) injection 4 mg, Q8H PRN    oxyCODONE (ROXICODONE) IR tablet 5 mg, Q3H PRN    prenatal multivitamin tablet 1 tablet, Daily    witch hazel-glycerin (TUCKS) topical pad 1 Pad, Q4H PRN  Invasive Devices       Peripheral Intravenous Line  Duration             Peripheral IV Dorsal (posterior);Left Hand -- days                      Antonella Multani MD  3/26/2025 11:48 AM

## 2025-03-27 VITALS
DIASTOLIC BLOOD PRESSURE: 87 MMHG | TEMPERATURE: 97.8 F | HEART RATE: 67 BPM | OXYGEN SATURATION: 99 % | SYSTOLIC BLOOD PRESSURE: 137 MMHG | HEIGHT: 64 IN | RESPIRATION RATE: 18 BRPM | WEIGHT: 168 LBS | BODY MASS INDEX: 28.68 KG/M2

## 2025-03-27 PROCEDURE — 99024 POSTOP FOLLOW-UP VISIT: CPT | Performed by: OBSTETRICS & GYNECOLOGY

## 2025-03-27 PROCEDURE — NC001 PR NO CHARGE: Performed by: OBSTETRICS & GYNECOLOGY

## 2025-03-27 RX ORDER — NIFEDIPINE 30 MG
30 TABLET, EXTENDED RELEASE ORAL DAILY
Qty: 30 TABLET | Refills: 0 | Status: SHIPPED | OUTPATIENT
Start: 2025-03-27

## 2025-03-27 RX ORDER — NIFEDIPINE 30 MG/1
30 TABLET, EXTENDED RELEASE ORAL DAILY
Status: DISCONTINUED | OUTPATIENT
Start: 2025-03-27 | End: 2025-03-27 | Stop reason: HOSPADM

## 2025-03-27 RX ORDER — FUROSEMIDE 20 MG/1
20 TABLET ORAL DAILY
Qty: 3 TABLET | Refills: 0 | Status: SHIPPED | OUTPATIENT
Start: 2025-03-27 | End: 2025-03-30

## 2025-03-27 RX ORDER — IBUPROFEN 600 MG/1
600 TABLET, FILM COATED ORAL EVERY 6 HOURS PRN
Qty: 30 TABLET | Refills: 0 | Status: SHIPPED | OUTPATIENT
Start: 2025-03-27

## 2025-03-27 RX ORDER — DOCUSATE SODIUM 100 MG/1
100 CAPSULE, LIQUID FILLED ORAL 2 TIMES DAILY PRN
Qty: 60 CAPSULE | Refills: 0 | Status: SHIPPED | OUTPATIENT
Start: 2025-03-27

## 2025-03-27 RX ORDER — NIFEDIPINE 30 MG/1
60 TABLET, EXTENDED RELEASE ORAL DAILY
Status: DISCONTINUED | OUTPATIENT
Start: 2025-03-27 | End: 2025-03-27

## 2025-03-27 RX ADMIN — IBUPROFEN 600 MG: 600 TABLET ORAL at 12:26

## 2025-03-27 RX ADMIN — DOCUSATE SODIUM 100 MG: 100 CAPSULE, LIQUID FILLED ORAL at 09:05

## 2025-03-27 RX ADMIN — NIFEDIPINE 30 MG: 30 TABLET, FILM COATED, EXTENDED RELEASE ORAL at 09:05

## 2025-03-27 RX ADMIN — FUROSEMIDE 20 MG: 20 TABLET ORAL at 09:05

## 2025-03-27 RX ADMIN — PRENATAL VITAMINS-IRON FUMARATE 27 MG IRON-FOLIC ACID 0.8 MG TABLET 1 TABLET: at 09:05

## 2025-03-27 NOTE — PLAN OF CARE
Problem: PAIN - ADULT  Goal: Verbalizes/displays adequate comfort level or baseline comfort level  Description: Interventions:- Encourage patient to monitor pain and request assistance- Assess pain using appropriate pain scale- Administer analgesics based on type and severity of pain and evaluate response- Implement non-pharmacological measures as appropriate and evaluate response- Consider cultural and social influences on pain and pain management- Notify physician/advanced practitioner if interventions unsuccessful or patient reports new pain  Outcome: Adequate for Discharge     Problem: INFECTION - ADULT  Goal: Absence or prevention of progression during hospitalization  Description: INTERVENTIONS:- Assess and monitor for signs and symptoms of infection- Monitor lab/diagnostic results- Monitor all insertion sites, i.e. indwelling lines, tubes, and drains- Monitor endotracheal if appropriate and nasal secretions for changes in amount and color- [place] appropriate cooling/warming therapies per order- Administer medications as ordered- Instruct and encourage patient and family to use good hand hygiene technique- Identify and instruct in appropriate isolation precautions for identified infection/condition  Outcome: Adequate for Discharge  Goal: Absence of fever/infection during neutropenic period  Description: INTERVENTIONS:- Monitor WBC  Outcome: Adequate for Discharge     Problem: SAFETY ADULT  Goal: Patient will remain free of falls  Description: INTERVENTIONS:- Educate patient/family on patient safety including physical limitations- Instruct patient to call for assistance with activity - Consult OT/PT to assist with strengthening/mobility - Keep Call bell within reach- Keep bed low and locked with side rails adjusted as appropriate- Keep care items and personal belongings within reach- Initiate and maintain comfort rounds-   Outcome: Adequate for Discharge  Goal: Maintain or return to baseline ADL  function  Description: INTERVENTIONS:-  Assess patient's ability to carry out ADLs; assess patient's baseline for ADL function and identify physical deficits which impact ability to perform ADLs (bathing, care of mouth/teeth, toileting, grooming, dressing, etc.)- Assess/evaluate cause of self-care deficits - Assess range of motion- Assess patient's mobility; develop plan if impaired- Assess patient's need for assistive devices and provide as appropriate- Encourage maximum independence but intervene and supervise when necessary- Involve family in performance of ADLs- Assess for home care needs following discharge - Consider OT consult to assist with ADL evaluation and planning for discharge- Provide patient education as appropriate  Outcome: Adequate for Discharge    Outcome: Adequate for Discharge     Problem: Knowledge Deficit  Goal: Patient/family/caregiver demonstrates understanding of disease process, treatment plan, medications, and discharge instructions  Description: Complete learning assessment and assess knowledge base.Interventions:- Provide teaching at level of understanding- Provide teaching via preferred learning methods  Outcome: Adequate for Discharge     Problem: DISCHARGE PLANNING  Goal: Discharge to home or other facility with appropriate resources  Description: INTERVENTIONS:- Identify barriers to discharge w/patient and caregiver- Arrange for needed discharge resources and transportation as appropriate- Identify discharge learning needs (meds, wound care, etc.)- Arrange for interpretive services to assist at discharge as needed- Refer to Case Management Department for coordinating discharge planning if the patient needs post-hospital services based on physician/advanced practitioner order or complex needs related to functional status, cognitive ability, or social support system  Outcome: Adequate for Discharge     Problem: POSTPARTUM  Goal: Experiences normal postpartum course  Description:  INTERVENTIONS:- Monitor maternal vital signs- Assess uterine involution and lochia  Outcome: Adequate for Discharge  Goal: Appropriate maternal -  bonding  Description: INTERVENTIONS:- Identify family support- Assess for appropriate maternal/infant bonding -Encourage maternal/infant bonding opportunities- Referral to  or  as needed  Outcome: Adequate for Discharge  Goal: Establishment of infant feeding pattern  Description: INTERVENTIONS:- Assess breast/bottle feeding- Refer to lactation as needed  Outcome: Adequate for Discharge    Outcome: Adequate for Discharge

## 2025-03-27 NOTE — PROGRESS NOTES
"Progress Note - OB/GYN   Name: Beatriz Sánchez 24 y.o. female I MRN: [id number]  Unit/Bed#: -01 I Date of Admission: 3/23/2025   Date of Service: 3/27/2025 I Hospital Day: 3     Assessment & Plan  Normal vaginal delivery  A/P.  24 y.o.  PPD#2 s/p delivery (Vaginal, Spontaneous) at 36w0d of 2353 g (5 lb 3 oz) female , apgars 8 /9 .  (1) PPD#2.  Delivered 3/25/2025  6:29 PM.  Doing well.  Very much wants to go home.  --> Discharge home.  Severe pre-eclampsia, postpartum condition or complication  - s/p magnesium for > 12 hours postpartum. Started Procardia XL 30mg qDay yesterday, and 4/6 BP normal, only two mildly elevated.  --> Continue Procardia XL 30mg qDay.  --> Home BP monitoring, early followup.    Corky Stuart MD  25  ---------------------------------------------------------------------------------------------    Subjective/    Feels well.  Tolerating po, ambulating, voiding without difficulty.  Happy.  Bonding.  Mild cramps, appropriate lochia.    Objective/  Blood pressure 137/87, pulse 67, temperature 97.8 °F (36.6 °C), temperature source Temporal, resp. rate 18, height 5' 4\" (1.626 m), weight 76.2 kg (168 lb), SpO2 99%, currently breastfeeding.    Patient Vitals for the past 24 hrs:   BP Temp Temp src Pulse Resp SpO2   25 0840 137/87 97.8 °F (36.6 °C) Temporal 67 18 99 %   25 0500 144/76 98.1 °F (36.7 °C) Oral 61 16 98 %   25 2217 138/71 98.4 °F (36.9 °C) Oral 63 18 94 %   25 1500 144/88 97.8 °F (36.6 °C) Temporal 65 18 98 %   25 1100 134/64 98.1 °F (36.7 °C) Temporal 70 18 98 %   25 1030 139/83 97.7 °F (36.5 °C) Oral 59 20 97 %         Physical Exam/      General:  Alert, comfortable, NAD      Cardiovascular: Regular rate and rhythm      Respiratory: Clear to auscultation bilaterally.      Abdomen: Soft, non-tender, non-distended      Fundus: Firm, below umbilicus, nontender      Extremities: Warm, non-tender      Labs/      Blood type: "  O+/-      Rubella: Immune      Lab Results   Component Value Date    HGB 10.4 (L) 03/26/2025     03/26/2025    BUN 10 03/26/2025    CREATININE 0.73 03/26/2025    AST 29 03/26/2025    ALT 22 03/26/2025    UTPCR 0.2 (H) 03/24/2025

## 2025-03-27 NOTE — DISCHARGE SUMMARY
Discharge Summary - OB/GYN   Name: Beatriz Sánchez 24 y.o. female I MRN: [id number]  Unit/Bed#: -01 I Date of Admission: 3/23/2025   Date of Service: 3/27/2025 I Hospital Day: 3    ADMISSION  Patient of: [facility] [place]  Admission Date: 3/23/2025   Admitting Attending: Dr. Janet Fay MD  Admitting Diagnoses:   Patient Active Problem List   Diagnosis    Infertility counseling    Hereditary disease in family possibly affecting fetus    Encounter for supervision of normal first pregnancy in third trimester    HSV-1 infection    Severe pre-eclampsia, postpartum condition or complication    Normal vaginal delivery       DELIVERY  Delivery Method: Vaginal, Spontaneous   Delivery Date and Time: 3/25/2025 6:29 PM  Delivery Attending: Janet Fay    DISCHARGE  Discharge Date: 3/27/25  Discharge Attending: Dr. Stuart  Discharge Diagnosis:   Same, Delivered  Clinical course: Admission to Delivery  Beatriz Sánchez is a 24 y.o.  who was admitted at 35w5d for GHTN.  She developed preeclampsia with severe features, and induction was started.    Reason for induction: Preeclampsia.   Induction method:  , ,Misoprostol;Perdomo/EASI Preeclampsia.     She progressed to complete and began pushing.    Delivery  Route of Delivery: Vaginal, Spontaneous    Anesthesia: Epidural,   QBL: Non-Surgical QBL (mL): 535        Delivery: Vaginal, Spontaneous at 3/25/2025 6:29 PM  Laceration: Perineal: 1° Repaired? Yes    Baby's Weight: 2353 g (5 lb 3 oz); 83    Apgar scores: 8  and 9  at 1 and 5 minutes, respectively    Clinical Course: Post-Delivery:  The post delivery course was unremarkable.  She continued her magnesium, and was started on Procardia.    On the day of discharge, the patient was ambulating, voiding spontaneously, tolerating oral intake, and hemodynamically stable. She was able to reasonably perform all ADLs. She had appropriate bowel function. Pain was well-controlled. She was discharged home on  postpartum/postop day #2 without complications. Patient was instructed to follow up with her OB as an outpatient and was given appropriate warnings to call her provider with problems or concerns.    Pertinent lab findings included:   Blood type O+.     Last three Hgb values:  Lab Results   Component Value Date    HGB 10.4 (L) 2025    HGB 11.7 2025    HGB 12.5 2025        Problem-specific follow-up plans included the following:  Assessment & Plan  Normal vaginal delivery  A/P.  24 y.o.  PPD#2 s/p delivery (Vaginal, Spontaneous) at 36w0d of 2353 g (5 lb 3 oz) female , apgars 8 /9 .  (1) PPD#2.  Delivered 3/25/2025  6:29 PM.  Doing well.  Very much wants to go home.  --> Discharge home.  Severe pre-eclampsia, postpartum condition or complication  - s/p magnesium for > 12 hours postpartum. Started Procardia XL 30mg qDay yesterday, and 4/6 BP normal, only two mildly elevated.  --> Continue Procardia XL 30mg qDay.  --> Home BP monitoring, early followup.      Discharge med list:       Medication List      START taking these medications     docusate sodium 100 mg capsule; Commonly known as: COLACE; Take 1   capsule (100 mg total) by mouth 2 (two) times a day as needed for   constipation   furosemide 20 mg tablet; Commonly known as: LASIX; Take 1 tablet (20 mg   total) by mouth daily for 3 doses   ibuprofen 600 mg tablet; Commonly known as: MOTRIN; Take 1 tablet (600   mg total) by mouth every 6 (six) hours as needed for moderate pain   NIFEdipine ER 30 MG 24 hr tablet; Commonly known as: ADALAT CC; Take 1   tablet (30 mg total) by mouth daily     CONTINUE taking these medications     CHOLINE PO   Omega-3 1000 MG Caps   Prenatal 19 29-1 MG Chew   valACYclovir 1,000 mg tablet; Commonly known as: VALTREX     STOP taking these medications     aspirin 81 mg chewable tablet       Condition at discharge:   good     Disposition:   Home    Planned Readmission:   No    Discharge Statement:  I have  spent a total time of 30 minutes in caring for this patient on the day of the visit/encounter. >30 minutes of time was spent on: Instructions for management, Risk factor reductions, Impressions, Counseling / Coordination of care, Documenting in the medical record, and Reviewing / ordering tests, medicine, procedures  .

## 2025-03-27 NOTE — ASSESSMENT & PLAN NOTE
- s/p magnesium for > 12 hours postpartum. Started Procardia XL 30mg qDay yesterday, and 4/6 BP normal, only two mildly elevated.  --> Continue Procardia XL 30mg qDay.  --> Home BP monitoring, early followup.

## 2025-03-27 NOTE — ASSESSMENT & PLAN NOTE
A/P.  24 y.o.  PPD#2 s/p delivery (Vaginal, Spontaneous) at 36w0d of 2353 g (5 lb 3 oz) female , apgars 8 /9 .  (1) PPD#2.  Delivered 3/25/2025  6:29 PM.  Doing well.  Very much wants to go home.  --> Discharge home.

## 2025-03-27 NOTE — PLAN OF CARE
Problem: PAIN - ADULT  Goal: Verbalizes/displays adequate comfort level or baseline comfort level  Description: Interventions:- Encourage patient to monitor pain and request assistance- Assess pain using appropriate pain scale- Administer analgesics based on type and severity of pain and evaluate response- Implement non-pharmacological measures as appropriate and evaluate response- Consider cultural and social influences on pain and pain management- Notify physician/advanced practitioner if interventions unsuccessful or patient reports new pain  Outcome: Progressing     Problem: INFECTION - ADULT  Goal: Absence or prevention of progression during hospitalization  Description: INTERVENTIONS:- Assess and monitor for signs and symptoms of infection- Monitor lab/diagnostic results- Monitor all insertion sites, i.e. indwelling lines, tubes, and drains- Monitor endotracheal if appropriate and nasal secretions for changes in amount and color- [place] appropriate cooling/warming therapies per order- Administer medications as ordered- Instruct and encourage patient and family to use good hand hygiene technique- Identify and instruct in appropriate isolation precautions for identified infection/condition  Outcome: Progressing  Goal: Absence of fever/infection during neutropenic period  Description: INTERVENTIONS:- Monitor WBC  Outcome: Progressing     Problem: SAFETY ADULT  Goal: Patient will remain free of falls  Description: INTERVENTIONS:- Educate patient/family on patient safety including physical limitations- Instruct patient to call for assistance with activity - Consult OT/PT to assist with strengthening/mobility - Keep Call bell within reach- Keep bed low and locked with side rails adjusted as appropriate- Keep care items and personal belongings within reach- Initiate and maintain comfort rounds- Make Fall Risk Sign visible to staff- Offer Toileting every   Outcome: Progressing  Goal: Maintain or return to  baseline ADL function  Description: INTERVENTIONS:-  Assess patient's ability to carry out ADLs; assess patient's baseline for ADL function and identify physical deficits which impact ability to perform ADLs (bathing, care of mouth/teeth, toileting, grooming, dressing, etc.)- Assess/evaluate cause of self-care deficits - Assess range of motion- Assess patient's mobility; develop plan if impaired- Assess patient's need for assistive devices and provide as appropriate- Encourage maximum independence but intervene and supervise when necessary- Involve family in performance of ADLs- Assess for home care needs following discharge - Consider OT consult to assist with ADL evaluation and planning for discharge- Provide patient education as appropriate  Outcome: Progressing  Goal: Maintains/Returns to pre admission functional level  Description: INTERVENTIONS:- Perform AM-PAC 6 Click Basic Mobility/ Daily Activity assessment daily.- Set and communicate daily mobility goal to care team and patient/family/caregiver. - Collaborate with rehabilitation services on mobility goals if consulted  Outcome: Progressing     Problem: Knowledge Deficit  Goal: Patient/family/caregiver demonstrates understanding of disease process, treatment plan, medications, and discharge instructions  Description: Complete learning assessment and assess knowledge base.Interventions:- Provide teaching at level of understanding- Provide teaching via preferred learning methods  Outcome: Progressing     Problem: DISCHARGE PLANNING  Goal: Discharge to home or other facility with appropriate resources  Description: INTERVENTIONS:- Identify barriers to discharge w/patient and caregiver- Arrange for needed discharge resources and transportation as appropriate- Identify discharge learning needs (meds, wound care, etc.)- Arrange for interpretive services to assist at discharge as needed- Refer to Case Management Department for coordinating discharge planning if  the patient needs post-hospital services based on physician/advanced practitioner order or complex needs related to functional status, cognitive ability, or social support system  Outcome: Progressing     Problem: POSTPARTUM  Goal: Experiences normal postpartum course  Description: INTERVENTIONS:- Monitor maternal vital signs- Assess uterine involution and lochia  Outcome: Progressing  Goal: Appropriate maternal -  bonding  Description: INTERVENTIONS:- Identify family support- Assess for appropriate maternal/infant bonding -Encourage maternal/infant bonding opportunities- Referral to  or  as needed  Outcome: Progressing  Goal: Establishment of infant feeding pattern  Description: INTERVENTIONS:- Assess breast/bottle feeding- Refer to lactation as needed  Outcome: Progressing  Goal: Incision(s), wounds(s) or drain site(s) healing without S/S of infection  Description: INTERVENTIONS- Assess and document dressing, incision, wound bed, drain sites and surrounding tissue- Provide patient and family education-   Outcome: Progressing

## 2025-03-28 ENCOUNTER — TELEPHONE (OUTPATIENT)
Dept: LABOR AND DELIVERY | Facility: HOSPITAL | Age: 25
End: 2025-03-28

## 2025-03-28 ENCOUNTER — NURSE TRIAGE (OUTPATIENT)
Age: 25
End: 2025-03-28

## 2025-03-28 ENCOUNTER — HOSPITAL ENCOUNTER (OUTPATIENT)
Facility: HOSPITAL | Age: 25
Discharge: HOME | End: 2025-03-28
Attending: OBSTETRICS & GYNECOLOGY | Admitting: OBSTETRICS & GYNECOLOGY
Payer: COMMERCIAL

## 2025-03-28 ENCOUNTER — TELEPHONE (OUTPATIENT)
Dept: OBGYN CLINIC | Facility: CLINIC | Age: 25
End: 2025-03-28

## 2025-03-28 VITALS
OXYGEN SATURATION: 99 % | RESPIRATION RATE: 18 BRPM | DIASTOLIC BLOOD PRESSURE: 86 MMHG | HEART RATE: 83 BPM | SYSTOLIC BLOOD PRESSURE: 130 MMHG

## 2025-03-28 PROCEDURE — 88307 TISSUE EXAM BY PATHOLOGIST: CPT | Performed by: PATHOLOGY

## 2025-03-28 PROCEDURE — 99212 OFFICE O/P EST SF 10 MIN: CPT

## 2025-03-28 PROCEDURE — 99214 OFFICE O/P EST MOD 30 MIN: CPT | Performed by: OBSTETRICS & GYNECOLOGY

## 2025-03-28 RX ORDER — ACETAMINOPHEN 325 MG/1
650 TABLET ORAL EVERY 6 HOURS PRN
Status: COMPLETED | OUTPATIENT
Start: 2025-03-28 | End: 2025-03-28

## 2025-03-28 RX ADMIN — ACETAMINOPHEN 650 MG: 325 TABLET, FILM COATED ORAL at 17:08

## 2025-03-28 NOTE — TELEPHONE ENCOUNTER
3/28/25 LMOM and my chart to call office and schedule 1 week BP check from discharge date and 3 week PP with Dr. Fay.

## 2025-03-28 NOTE — PROGRESS NOTES
Progress Note - OB/GYN   Name: Beatriz Sánchez 24 y.o. female I MRN: [id number]  Unit/Bed#: LD TRIAGE 1-01 I Date of Admission: 3/28/2025   Date of Service: 3/29/2025 I Hospital Day: 0     Assessment & Plan  Severe pre-eclampsia, postpartum condition or complication  Presented with elevated blood pressures at home, however normotensive here on mulitple checks.    Mild headache, likely related to sleep deprivation, resolved with tylenol.  Home BP cuff reading at least 10pts higher both systolic and diastolic.   Mom will take manual BPs at home.  Patient comfortable with discharge and follow up with office as scheduled for BP check.    Precautions reviewed.     OB Progress Note  Subjective     Patient with elevated blood pressures at home (150's systolic) as well as headache.     LE edema is significantly improved.     No visual changes, RUQ/epigastric pain.       Objective :  HR:  [] 83  BP: (126-130)/(84-86) 130/86  Resp:  [18] 18  SpO2:  [99 %] 99 %    Physical Exam  Vitals reviewed.   Constitutional:       General: She is not in acute distress.     Appearance: Normal appearance. She is not ill-appearing.   Pulmonary:      Effort: No respiratory distress.   Musculoskeletal:         General: No tenderness.      Right lower leg: Edema (trace) present.      Left lower leg: Edema (trace) present.   Skin:     General: Skin is warm and dry.   Neurological:      Mental Status: She is alert and oriented to person, place, and time.   Psychiatric:         Mood and Affect: Mood normal.         Behavior: Behavior normal.           Lab Results: I have reviewed the following results:  Lab Results   Component Value Date    WBC 14.95 (H) 03/26/2025    HGB 10.4 (L) 03/26/2025    HCT 31.1 (L) 03/26/2025    MCV 96 03/26/2025     03/26/2025

## 2025-03-28 NOTE — TELEPHONE ENCOUNTER
"FOLLOW UP: ESC to Dr Almanza- spoke with patient directly, heading to triage.     REASON FOR CONVERSATION: Postpartum Complications    SYMPTOMS: Elevated BP    OTHER: FOLLOW UP: 3 day PP patient, s/p   with pre e in pregnancy - was discharged yesterday on 30mg nifedipine daily. Took her BP prior to taking the medication at 9AM and BP was 152/98. Took her BP 10 mins ago and it is 155/96, HR was 120 and she has a headache. Denies CP or SOB. Has not take anything for headache yet and does admit to being fatigued caring for the . Denies any other symptoms besides mild ankle swelling. Discussed with patient tylenol/ibuprofen for HA, hydration, cold pack and rest. Reviewed with patient monitoring symptoms in the meantime, and contacting the office back with any new or worsening symptoms.     REASON FOR CONVERSATION: Postpartum Complications  Reason for Disposition   [1] Taking blood pressure medicine AND [2] blood pressure is above goal set by doctor (or NP/PA)    Answer Assessment - Initial Assessment Questions  1. BLOOD PRESSURE: \"What is your blood pressure?\" \"Did you take at least two measurements 5 minutes apart?\"      152/98 prior to 9AM this mornings nifedipine, 155/96 now   2. ONSET: \"When did you take your blood pressure?\"      5 mins ago   3. HOW: \"How did you take your blood pressure?\" (e.g., automatic home BP monitor, visiting nurse)      Auto home cuff   4. HISTORY: \"Do you have a history of high blood pressure?\" \"Were you diagnosed with preeclampsia during this or previous pregnancies?\"      Pre eclampsia during pregnancy   5. MEDICINES: \"Are you taking any medicines for blood pressure?\" \"Have you missed any doses recently?\"      Nifedipine 30mg   6. DELIVERY: \"When was your delivery date?\" \"Vaginal delivery or ?\" \"How many babies?\"  (e.g., single baby, twins)         7. OTHER SYMPTOMS: \"Do you have any other symptoms?\" (e.g., abdomen pain, chest pain, difficulty " breathing, hand or face swelling, headache, vision changes)      Headache    Protocols used: Postpartum - High Blood Pressure-Adult-AH

## 2025-03-28 NOTE — UTILIZATION REVIEW
MOTHER AND BABY DISCHARGED MARCH 27    NOTIFICATION OF ADMISSION DISCHARGE   This is a Notification of Discharge from [facility]. Please be advised that this patient has been discharge from our facility. Below you will find the admission and discharge date and time including the patient’s disposition.   UTILIZATION REVIEW CONTACT:  Utilization Review Assistants  Network Utilization Review Department  Phone: [phone] x carefully listen to the prompts. All voicemails are confidential.  Email: [email or web address]     ADMISSION INFORMATION  PRESENTATION DATE: 3/23/2025  4:47 PM  OBERVATION ADMISSION DATE: 03/23/2025 1802  INPATIENT ADMISSION DATE: 3/24/25  4:18 PM   DISCHARGE DATE: 3/27/2025  4:42 PM   DISPOSITION:Home/Self Care    Network Utilization Review Department  ATTENTION: Please call with any questions or concerns to [phone] and carefully listen to the prompts so that you are directed to the right person. All voicemails are confidential.   For Discharge needs, contact Care Management DC Support Team at [phone] opt. 2  Send all requests for admission clinical reviews, approved or denied determinations and any other requests to dedicated fax number below belonging to the campus where the patient is receiving treatment. List of dedicated fax numbers for the Facilities:  FACILITY NAME UR FAX NUMBER   ADMISSION DENIALS (Administrative/Medical Necessity) [phone]   DISCHARGE SUPPORT TEAM ([facility]) [phone]   PARENT CHILD HEALTH (Maternity/NICU/Pediatrics) [phone]   [facility] [phone]   [facility] [phone]   Critical access hospital [phone]   [facility] [phone]   [facility] [phone]   [facility] [phone]   [facility] [phone]   [facility]  [facility] [phone]   [facility] [phone]   [facility] [phone]   General acute hospital [phone]   [facility] [phone]

## 2025-03-28 NOTE — TELEPHONE ENCOUNTER
Pt called emergency line as was just discharged yesterday after IOL  for PreE.  Pt taking Procardia 30XL.  C/o headache.   Bps at home 1152/98 and 155/96 with a HR of 120.      Pt is 20-30min away.  Pt to come to triage with her BP cuff for evaluation.  Feeding her baby now and will leave shortly.       ARTHUR Almanza MD, FACOG

## 2025-03-28 NOTE — TELEPHONE ENCOUNTER
POSTPARTUM PHONE CALL ASSESSMENT    Attempt to call. LM on VM and mychart message sent.     Date of Delivery: 3/25/2025  Delivering Provider: Dr. Fay   Mode:   Delivery Notes/Complications: Admitted at 35.5w for GHTN. She developed preeclampsia with severe features and induction was started. The post delivery course was unremarkable.  She continued her magnesium, and was started on Procardia.      Do you still have bleeding/pain? If so, how much/how severe?      Regular BMs/Urination?     Breastfeeding/Formula/Both?     How are you doing emotionally?    If struggling, obtain a EPDS Score:     Do you have any other questions or concerns for us or your provider?     Have you scheduled the pediatrician appointment with pediatrician?     Do you have a postpartum visit scheduled? Yes   Date scheduled: 25 Provider: Dr. Metcalf

## 2025-03-29 NOTE — ASSESSMENT & PLAN NOTE
Presented with elevated blood pressures at home, however normotensive here on mulitple checks.    Mild headache, likely related to sleep deprivation, resolved with tylenol.  Home BP cuff reading at least 10pts higher both systolic and diastolic.   Mom will take manual BPs at home.  Patient comfortable with discharge and follow up with office as scheduled for BP check.    Precautions reviewed.

## 2025-04-01 ENCOUNTER — CLINICAL SUPPORT (OUTPATIENT)
Dept: OBGYN CLINIC | Facility: CLINIC | Age: 25
End: 2025-04-01

## 2025-04-01 VITALS
SYSTOLIC BLOOD PRESSURE: 130 MMHG | DIASTOLIC BLOOD PRESSURE: 90 MMHG | WEIGHT: 156 LBS | HEIGHT: 64 IN | BODY MASS INDEX: 26.63 KG/M2

## 2025-04-16 ENCOUNTER — POSTPARTUM VISIT (OUTPATIENT)
Dept: OBGYN CLINIC | Facility: CLINIC | Age: 25
End: 2025-04-16

## 2025-04-16 VITALS
SYSTOLIC BLOOD PRESSURE: 122 MMHG | DIASTOLIC BLOOD PRESSURE: 84 MMHG | HEIGHT: 64 IN | WEIGHT: 155 LBS | BODY MASS INDEX: 26.46 KG/M2

## 2025-04-16 PROCEDURE — 99024 POSTOP FOLLOW-UP VISIT: CPT | Performed by: OBSTETRICS & GYNECOLOGY

## 2025-04-16 NOTE — PROGRESS NOTES
"[facility] [address], [address], [place] [address]    Assessment/Plan:  Beatriz is a 24 y.o. year old  who presents for postpartum visit.    Routine Postpartum Care  Normal postpartum exam  Contraception: condoms  Depression Screen: Low risk  Feeding: breast  Psychosocial support: good  Patient Education: \"Fourth Trimester Project: [employer]\"  Cervical cancer screening Up to Date, next due at wellness  Follow up in: 3 months or as needed.    Additional Problems:  1. Routine postpartum follow-up  2. Severe pre-eclampsia, postpartum condition or complication  Assessment & Plan:  Stopped nifedipine. BP has been normal        Subjective:     CC: Postpartum visit    Beatriz Sánchez is a 24 y.o. y.o. female  who presents for a postpartum visit.     She is 3 weeks postpartum following a spontaneous vaginal delivery on 3/25/25 at 36.0  weeks for preeclampsia with severe features.    Outcome: spontaneous vaginal delivery. Anesthesia: epidural. Postpartum course has been unremarkable. Baby's course has been unremarkable. Baby is feeding by breast.     Bleeding no bleeding. Bowel function is normal. Bladder function is normal. Patient is not sexually active. Contraception method is condoms. Postpartum depression screening: negative.    The following portions of the patient's history were reviewed and updated as appropriate: allergies, current medications, past family history, past medical history, obstetric history, gynecologic history, past social history, past surgical history and problem list.      Objective:  /84 (BP Location: Left arm, Patient Position: Sitting, Cuff Size: Standard)   Ht 5' 4\" (1.626 m)   Wt 70.3 kg (155 lb)   LMP  (Exact Date)   Breastfeeding Yes   BMI 26.61 kg/m²   Pregravid Weight/BMI: 62.1 kg (137 lb) (BMI 23.50)  Current Weight: 70.3 kg (155 lb)   Total Weight Gain: 14.1 kg (31 lb)   Pre- Vitals      Flowsheet Row Most Recent Value "   Prenatal Assessment    Prenatal Vitals    Blood Pressure 122/84   Weight - Scale 70.3 kg (155 lb)   Urine Albumin/Glucose    Dilation/Effacement/Station    Vaginal Drainage    Edema              General: Well appearing, no distress.  Mood and affect: Appropriate.  Abdomen: Soft, nontender  Thyroid: No masses  Incision: n/a  Vulva: Well healed  Vagina: Well healed. No lesions  Urethra: Normal  Cervix: Healed, no lesions  Uterus: Normal size, no masses  Adnexa: No pain or masses  Extremities: Warm and well perfused.  Non tender.

## 2025-04-17 ENCOUNTER — DOCUMENTATION (OUTPATIENT)
Dept: OBGYN CLINIC | Facility: CLINIC | Age: 25
End: 2025-04-17

## 2025-05-21 ENCOUNTER — NURSE TRIAGE (OUTPATIENT)
Age: 25
End: 2025-05-21

## 2025-05-21 NOTE — TELEPHONE ENCOUNTER
Reviewed recommendations from provider. Patient asking for clarification on how long she should expect to monitor her bleeding for before it becomes a concern.     Advised patient will clarify with provider expected timeframe. Bleeding precautions reviewed with patient.

## 2025-05-21 NOTE — TELEPHONE ENCOUNTER
"FOLLOW UP: Advised bleeding postpartum can be irregular. Discuss with on-call provider, call patient back with recommendations. Patient will call back if bleeding increases or if feeling lightheaded/dizzy or other symptoms occur.     REASON FOR CONVERSATION: Vaginal Bleeding    SYMPTOMS: First menses postpartum started on 5/4 and bleeding has continued through today. Bleeding is not heavy, saturating 3 panty-liners a day. Small clots the size of pea. Denies abdominal pain.     OTHER: Delivered vaginally 3/25 - bled for about 3 weeks after delivery. Denies fever, abdominal pain. Did have irregular periods prior to pregnancy. States periods would last about 2 weeks. Denies feeling lightheaded/dizzy. Not on birth control.     DISPOSITION: Discuss with Provider and Call Back Patient (overriding See Within 2 Weeks in Office)      Reason for Disposition   Periods last > 7 days    Answer Assessment - Initial Assessment Questions  1. BLEEDING SEVERITY: \"Describe the bleeding that you are having.\" \"How much bleeding is there?\"       Saturating 3 panty liners a day  2. ONSET: \"When did the bleeding begin?\" \"Is it continuing now?\"      5/4  3. MENSTRUAL PERIOD: \"When was the last normal menstrual period?\" \"How is this different than your period?\"      First menses since delivering baby started 5/4  4. REGULARITY: \"How regular are your periods?\"      Prior to pregnancy periods were irregular  5. ABDOMEN PAIN: \"Do you have any pain?\" \"How bad is the pain?\"  (e.g., Scale 0-10; none, mild, moderate, or severe)      denies  6. PREGNANCY: \"Is there any chance you are pregnant?\" \"When was your last menstrual period?\"      Postpartum patient delivered 3/25  7. BREASTFEEDING: \"Are you breastfeeding?\"      Yes, exclusively pumping  8. HORMONE MEDICINES: \"Are you taking any hormone medicines, prescription or over-the-counter?\" (e.g., birth control pills, estrogen)      denies  10. CAUSE: \"What do you think is causing the bleeding?\" " "(e.g., recent gyn surgery, recent gyn procedure; known bleeding disorder, cervical cancer, polycystic ovarian disease, fibroids)          unsure  11. HEMODYNAMIC STATUS: \"Are you weak or feeling lightheaded?\" If Yes, ask: \"Can you stand and walk normally?\"         Denies changes to hemodynamic status  12. OTHER SYMPTOMS: \"What other symptoms are you having with the bleeding?\" (e.g., passed tissue, vaginal discharge, fever, menstrual-type cramps)        Denies fever. Does have small clots about size of pea.    Protocols used: Vaginal Bleeding - Abnormal-Adult-OH    "

## 2025-07-18 ENCOUNTER — TELEPHONE (OUTPATIENT)
Age: 25
End: 2025-07-18